# Patient Record
Sex: FEMALE | NOT HISPANIC OR LATINO | Employment: OTHER | URBAN - METROPOLITAN AREA
[De-identification: names, ages, dates, MRNs, and addresses within clinical notes are randomized per-mention and may not be internally consistent; named-entity substitution may affect disease eponyms.]

---

## 2023-05-16 DIAGNOSIS — E03.9 HYPOTHYROIDISM, UNSPECIFIED TYPE: ICD-10-CM

## 2023-05-16 RX ORDER — LEVOTHYROXINE SODIUM 137 UG/1
137 TABLET ORAL DAILY
Qty: 90 TABLET | Refills: 0 | Status: SHIPPED | OUTPATIENT
Start: 2023-05-16 | End: 2023-08-07

## 2023-05-16 RX ORDER — LEVOTHYROXINE SODIUM 137 UG/1
1 TABLET ORAL DAILY
COMMUNITY
Start: 2013-03-04 | End: 2023-05-16 | Stop reason: SDUPTHER

## 2023-08-07 DIAGNOSIS — E03.9 HYPOTHYROIDISM, UNSPECIFIED TYPE: ICD-10-CM

## 2023-08-07 RX ORDER — LEVOTHYROXINE SODIUM 137 UG/1
TABLET ORAL
Qty: 30 TABLET | Refills: 0 | Status: SHIPPED | OUTPATIENT
Start: 2023-08-07 | End: 2023-09-25 | Stop reason: SDUPTHER

## 2023-09-11 ENCOUNTER — HOSPITAL ENCOUNTER (OUTPATIENT)
Dept: DATA CONVERSION | Facility: HOSPITAL | Age: 68
Discharge: HOME | End: 2023-09-11
Payer: MEDICARE

## 2023-09-11 DIAGNOSIS — Z12.31 ENCOUNTER FOR SCREENING MAMMOGRAM FOR MALIGNANT NEOPLASM OF BREAST: ICD-10-CM

## 2023-09-13 ENCOUNTER — OFFICE VISIT (OUTPATIENT)
Dept: PRIMARY CARE | Facility: CLINIC | Age: 68
End: 2023-09-13
Payer: MEDICARE

## 2023-09-13 VITALS
HEART RATE: 75 BPM | OXYGEN SATURATION: 98 % | HEIGHT: 63 IN | RESPIRATION RATE: 18 BRPM | SYSTOLIC BLOOD PRESSURE: 130 MMHG | DIASTOLIC BLOOD PRESSURE: 60 MMHG | WEIGHT: 155.2 LBS | BODY MASS INDEX: 27.5 KG/M2

## 2023-09-13 DIAGNOSIS — Z00.00 ROUTINE GENERAL MEDICAL EXAMINATION AT HEALTH CARE FACILITY: ICD-10-CM

## 2023-09-13 DIAGNOSIS — E03.9 ACQUIRED HYPOTHYROIDISM: ICD-10-CM

## 2023-09-13 DIAGNOSIS — Z00.00 MEDICARE ANNUAL WELLNESS VISIT, SUBSEQUENT: ICD-10-CM

## 2023-09-13 DIAGNOSIS — I10 BENIGN ESSENTIAL HYPERTENSION: Primary | ICD-10-CM

## 2023-09-13 PROBLEM — N95.1 MENOPAUSAL SYMPTOM: Status: RESOLVED | Noted: 2023-09-13 | Resolved: 2023-09-13

## 2023-09-13 PROBLEM — I47.10 PAROXYSMAL SUPRAVENTRICULAR TACHYCARDIA (CMS-HCC): Status: RESOLVED | Noted: 2023-09-13 | Resolved: 2023-09-13

## 2023-09-13 PROBLEM — H90.3 ASYMMETRICAL SENSORINEURAL HEARING LOSS: Status: RESOLVED | Noted: 2023-09-13 | Resolved: 2023-09-13

## 2023-09-13 PROBLEM — T14.8XXA CONTUSION: Status: RESOLVED | Noted: 2023-09-13 | Resolved: 2023-09-13

## 2023-09-13 PROBLEM — R60.0 LEG EDEMA, LEFT: Status: RESOLVED | Noted: 2023-09-13 | Resolved: 2023-09-13

## 2023-09-13 PROBLEM — L71.9 ROSACEA: Status: ACTIVE | Noted: 2023-09-13

## 2023-09-13 PROBLEM — E83.52 HYPERCALCEMIA: Status: RESOLVED | Noted: 2023-09-13 | Resolved: 2023-09-13

## 2023-09-13 PROBLEM — J01.10 ACUTE FRONTAL SINUSITIS: Status: RESOLVED | Noted: 2023-09-13 | Resolved: 2023-09-13

## 2023-09-13 PROBLEM — S89.92XA INJURY OF LEFT KNEE: Status: RESOLVED | Noted: 2023-09-13 | Resolved: 2023-09-13

## 2023-09-13 PROBLEM — S87.02XA: Status: RESOLVED | Noted: 2023-09-13 | Resolved: 2023-09-13

## 2023-09-13 PROBLEM — D12.6 COLON ADENOMAS: Status: RESOLVED | Noted: 2023-09-13 | Resolved: 2023-09-13

## 2023-09-13 PROBLEM — S76.309A HAMSTRING INJURY: Status: RESOLVED | Noted: 2023-09-13 | Resolved: 2023-09-13

## 2023-09-13 PROBLEM — E78.5 HYPERLIPIDEMIA: Status: ACTIVE | Noted: 2023-09-13
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
## 2023-09-13 PROBLEM — K21.9 GERD (GASTROESOPHAGEAL REFLUX DISEASE): Status: ACTIVE | Noted: 2023-09-13

## 2023-09-13 PROBLEM — I42.8 NICM (NONISCHEMIC CARDIOMYOPATHY) (MULTI): Status: RESOLVED | Noted: 2023-09-13 | Resolved: 2023-09-13

## 2023-09-13 PROBLEM — I42.9 CARDIOMYOPATHY (MULTI): Status: RESOLVED | Noted: 2023-09-13 | Resolved: 2023-09-13

## 2023-09-13 PROBLEM — G62.9 PERIPHERAL NEUROPATHY: Status: ACTIVE | Noted: 2023-09-13

## 2023-09-13 PROBLEM — S87.82XA: Status: RESOLVED | Noted: 2023-09-13 | Resolved: 2023-09-13

## 2023-09-13 PROBLEM — S22.009A FRACTURE OF THORACIC SPINE (MULTI): Status: RESOLVED | Noted: 2023-09-13 | Resolved: 2023-09-13

## 2023-09-13 PROBLEM — M85.80 OSTEOPENIA: Status: RESOLVED | Noted: 2023-09-13 | Resolved: 2023-09-13

## 2023-09-13 PROBLEM — Z96.642 STATUS POST TOTAL REPLACEMENT OF LEFT HIP: Status: RESOLVED | Noted: 2023-09-13 | Resolved: 2023-09-13

## 2023-09-13 PROBLEM — C85.90 NON-HODGKIN'S LYMPHOMA (MULTI): Status: RESOLVED | Noted: 2023-09-13 | Resolved: 2023-09-13

## 2023-09-13 PROBLEM — M81.0 OSTEOPOROSIS: Status: ACTIVE | Noted: 2023-09-13

## 2023-09-13 PROBLEM — H90.3 BILATERAL SENSORINEURAL HEARING LOSS: Status: ACTIVE | Noted: 2023-09-13

## 2023-09-13 PROBLEM — D33.3 LEFT ACOUSTIC NEUROMA (MULTI): Status: RESOLVED | Noted: 2023-09-13 | Resolved: 2023-09-13

## 2023-09-13 PROBLEM — R29.898 WEAKNESS OF LEFT HIP: Status: RESOLVED | Noted: 2023-09-13 | Resolved: 2023-09-13

## 2023-09-13 PROBLEM — S32.409A ACETABULAR FRACTURE (MULTI): Status: RESOLVED | Noted: 2023-09-13 | Resolved: 2023-09-13

## 2023-09-13 PROBLEM — M16.10 HIP ARTHRITIS: Status: RESOLVED | Noted: 2023-09-13 | Resolved: 2023-09-13

## 2023-09-13 PROCEDURE — 99213 OFFICE O/P EST LOW 20 MIN: CPT | Performed by: NURSE PRACTITIONER

## 2023-09-13 PROCEDURE — 1160F RVW MEDS BY RX/DR IN RCRD: CPT | Performed by: NURSE PRACTITIONER

## 2023-09-13 PROCEDURE — G0439 PPPS, SUBSEQ VISIT: HCPCS | Performed by: NURSE PRACTITIONER

## 2023-09-13 PROCEDURE — 1170F FXNL STATUS ASSESSED: CPT | Performed by: NURSE PRACTITIONER

## 2023-09-13 PROCEDURE — 1125F AMNT PAIN NOTED PAIN PRSNT: CPT | Performed by: NURSE PRACTITIONER

## 2023-09-13 PROCEDURE — 1159F MED LIST DOCD IN RCRD: CPT | Performed by: NURSE PRACTITIONER

## 2023-09-13 PROCEDURE — 3078F DIAST BP <80 MM HG: CPT | Performed by: NURSE PRACTITIONER

## 2023-09-13 PROCEDURE — 3075F SYST BP GE 130 - 139MM HG: CPT | Performed by: NURSE PRACTITIONER

## 2023-09-13 PROCEDURE — 1036F TOBACCO NON-USER: CPT | Performed by: NURSE PRACTITIONER

## 2023-09-13 RX ORDER — METOPROLOL SUCCINATE 50 MG/1
50 TABLET, EXTENDED RELEASE ORAL DAILY
COMMUNITY
End: 2024-02-27 | Stop reason: SDUPTHER

## 2023-09-13 RX ORDER — ATORVASTATIN CALCIUM 20 MG/1
20 TABLET, FILM COATED ORAL EVERY 24 HOURS
COMMUNITY
Start: 2015-09-10 | End: 2024-04-22 | Stop reason: SDUPTHER

## 2023-09-13 RX ORDER — LOSARTAN POTASSIUM AND HYDROCHLOROTHIAZIDE 25; 100 MG/1; MG/1
1 TABLET ORAL DAILY
COMMUNITY
Start: 2023-02-28 | End: 2024-02-07 | Stop reason: SDUPTHER

## 2023-09-13 RX ORDER — PNV NO.95/FERROUS FUM/FOLIC AC 28MG-0.8MG
1 TABLET ORAL DAILY
COMMUNITY

## 2023-09-13 ASSESSMENT — PATIENT HEALTH QUESTIONNAIRE - PHQ9
SUM OF ALL RESPONSES TO PHQ9 QUESTIONS 1 AND 2: 0
2. FEELING DOWN, DEPRESSED OR HOPELESS: NOT AT ALL
1. LITTLE INTEREST OR PLEASURE IN DOING THINGS: NOT AT ALL

## 2023-09-13 ASSESSMENT — ACTIVITIES OF DAILY LIVING (ADL)
TAKING_MEDICATION: INDEPENDENT
GROCERY_SHOPPING: INDEPENDENT
BATHING: INDEPENDENT
DOING_HOUSEWORK: INDEPENDENT
DRESSING: INDEPENDENT
MANAGING_FINANCES: INDEPENDENT

## 2023-09-13 NOTE — PATIENT INSTRUCTIONS
[facility] - Dr. Heredia  Have your flu shot done by the beginning of October  Have your thyroid level checked  You are current with your pneumonia vaccine

## 2023-09-13 NOTE — PROGRESS NOTES
"Subjective   Reason for Visit: Cady Haynes is an 68 y.o. female here for a Medicare Wellness visit.     Past Medical, Surgical, and Family History reviewed and updated in chart.    Reviewed all medications by prescribing practitioner or clinical pharmacist (such as prescriptions, OTCs, herbal therapies and supplements) and documented in the medical record.    Presents for follow up and AMW    She was thrown from her horse 2 days ago.  She did suffer T4 fracture.  Will be following up with ortho spine.      Mammogram 9/23  Pap not indicated  Colonoscopy 1/24/23  Prevnar 20 2022        Patient Care Team:  KARIN Aguiar as PCP - General  KAR[place] Dickerson [place] PCP [place] [facility] [place]  KARIN Aguiar as Primary Care Provider     Review of Systems   All other systems reviewed and are negative.      Objective   Vitals:  /60   Pulse 75   Resp 18   Ht 1.6 m (5' 3\")   Wt 70.4 kg (155 lb 3.2 oz)   SpO2 98%   BMI 27.49 kg/m²       Physical Exam  Vitals and nursing note reviewed.   Constitutional:       General: She is not in acute distress.     Appearance: Normal appearance. She is normal weight.   HENT:      Head: Normocephalic and atraumatic.      Right Ear: Tympanic membrane, ear canal and external ear normal.      Left Ear: Tympanic membrane, ear canal and external ear normal.      Nose: Nose normal.      Mouth/Throat:      Mouth: Mucous membranes are moist.      Pharynx: Oropharynx is clear.   Eyes:      Extraocular Movements: Extraocular movements intact.      Conjunctiva/sclera: Conjunctivae normal.      Pupils: Pupils are equal, round, and reactive to light.   Neck:      Vascular: No carotid bruit.   Cardiovascular:      Rate and Rhythm: Normal rate and regular rhythm.      Pulses: Normal pulses.      Heart sounds: Normal heart sounds.   Pulmonary:      Effort: Pulmonary effort is normal.      Breath sounds: Normal breath sounds.   Abdominal:      General: " Bowel sounds are normal. There is no distension.      Palpations: Abdomen is soft.      Tenderness: There is no abdominal tenderness.   Musculoskeletal:         General: Normal range of motion.      Cervical back: Normal range of motion and neck supple.      Right lower leg: No edema.      Left lower leg: No edema.      Comments: Upper thoracic tenderness with palpation   Lymphadenopathy:      Cervical: No cervical adenopathy.   Skin:     General: Skin is warm and dry.      Capillary Refill: Capillary refill takes less than 2 seconds.   Neurological:      General: No focal deficit present.      Mental Status: She is alert and oriented to person, place, and time. Mental status is at baseline.      Motor: No weakness.   Psychiatric:         Mood and Affect: Mood normal.         Behavior: Behavior normal.         Thought Content: Thought content normal.         Judgment: Judgment normal.         Assessment/Plan   Problem List Items Addressed This Visit       Benign essential hypertension    Overview     Stable  Continue losartan - hydrochlorothiazide 100-25 mg daily  Continue metoprolol succinate XL 50 mg daily  Reviewed cbc,cmp  Lipid ordered  Follow up in 6 months         Current Assessment & Plan     Stable  Continue losartan - hydrochlorothiazide 100-25 mg daily  Continue metoprolol succinate XL 50 mg daily  Reviewed cbc,cmp  Lipid ordered  Follow up in 6 months         Relevant Orders    Lipid Panel (Completed)    Hypothyroidism    Overview     TSH  Will adjust levothyroxine based on results         Current Assessment & Plan     TSH  Will adjust levothyroxine based on results         Relevant Orders    TSH with reflex to Free T4 if abnormal    Medicare annual wellness visit, subsequent    Overview     - UTD with vaccine   -  colonoscopy 2023  - mammogram 2023  - dexa will wait one year since she recently fractured T4 and may alter score   -  living will reviewed with patient           Current Assessment & Plan      - UTD with vaccine   -  colonoscopy 2023  - mammogram 2023  - dexa will wait one year since she recently fractured T4 and may alter score   -  living will reviewed with patient          Other Visit Diagnoses       Routine general medical examination at health care facility    -  Primary                Protocol For Photochemotherapy For Severe Photoresponsive Dermatoses: Tar And Nbuvb (Goeckerman Treatment): The patient received Photochemotherapy for severe photoresponsive dermatoses: Tar and NBUVB (Goeckerman treatment) requiring at least 4 to 8 hours of care under direct physician supervision.

## 2023-09-15 ENCOUNTER — LAB (OUTPATIENT)
Dept: LAB | Facility: LAB | Age: 68
End: 2023-09-15
Payer: MEDICARE

## 2023-09-15 DIAGNOSIS — E03.9 ACQUIRED HYPOTHYROIDISM: ICD-10-CM

## 2023-09-15 DIAGNOSIS — I10 BENIGN ESSENTIAL HYPERTENSION: ICD-10-CM

## 2023-09-15 PROBLEM — Z00.00 MEDICARE ANNUAL WELLNESS VISIT, SUBSEQUENT: Status: ACTIVE | Noted: 2023-09-15

## 2023-09-15 LAB
CHOLESTEROL (MG/DL) IN SER/PLAS: 143 MG/DL (ref 0–199)
CHOLESTEROL IN HDL (MG/DL) IN SER/PLAS: 46 MG/DL
CHOLESTEROL/HDL RATIO: 3.1
LDL: 66 MG/DL (ref 0–99)
THYROTROPIN (MIU/L) IN SER/PLAS BY DETECTION LIMIT <= 0.05 MIU/L: 1.21 MIU/L (ref 0.44–3.98)
TRIGLYCERIDE (MG/DL) IN SER/PLAS: 157 MG/DL (ref 0–149)
VLDL: 31 MG/DL (ref 0–40)

## 2023-09-15 PROCEDURE — 80061 LIPID PANEL: CPT

## 2023-09-15 PROCEDURE — 36415 COLL VENOUS BLD VENIPUNCTURE: CPT

## 2023-09-15 PROCEDURE — 84443 ASSAY THYROID STIM HORMONE: CPT

## 2023-09-15 ASSESSMENT — PATIENT HEALTH QUESTIONNAIRE - PHQ9
1. LITTLE INTEREST OR PLEASURE IN DOING THINGS: NOT AT ALL
SUM OF ALL RESPONSES TO PHQ9 QUESTIONS 1 AND 2: 0
2. FEELING DOWN, DEPRESSED OR HOPELESS: NOT AT ALL

## 2023-09-15 NOTE — ASSESSMENT & PLAN NOTE
- UTD with vaccine   -  colonoscopy 2023  - mammogram 2023  - dexa will wait one year since she recently fractured T4 and may alter score   -  living will reviewed with patient

## 2023-09-15 NOTE — ASSESSMENT & PLAN NOTE
Stable  Continue losartan - hydrochlorothiazide 100-25 mg daily  Continue metoprolol succinate XL 50 mg daily  Reviewed cbc,cmp  Lipid ordered  Follow up in 6 months

## 2023-09-25 DIAGNOSIS — E03.9 HYPOTHYROIDISM, UNSPECIFIED TYPE: ICD-10-CM

## 2023-09-25 RX ORDER — LEVOTHYROXINE SODIUM 137 UG/1
137 TABLET ORAL
Qty: 90 TABLET | Refills: 1 | Status: SHIPPED | OUTPATIENT
Start: 2023-09-25 | End: 2024-03-12 | Stop reason: SDUPTHER

## 2023-12-21 ENCOUNTER — OFFICE VISIT (OUTPATIENT)
Dept: PRIMARY CARE | Facility: CLINIC | Age: 68
End: 2023-12-21
Payer: MEDICARE

## 2023-12-21 VITALS
TEMPERATURE: 98.3 F | OXYGEN SATURATION: 95 % | HEART RATE: 85 BPM | SYSTOLIC BLOOD PRESSURE: 130 MMHG | HEIGHT: 63 IN | BODY MASS INDEX: 28.53 KG/M2 | DIASTOLIC BLOOD PRESSURE: 60 MMHG | WEIGHT: 161 LBS

## 2023-12-21 DIAGNOSIS — J01.00 ACUTE MAXILLARY SINUSITIS, RECURRENCE NOT SPECIFIED: Primary | ICD-10-CM

## 2023-12-21 PROCEDURE — 1036F TOBACCO NON-USER: CPT | Performed by: FAMILY MEDICINE

## 2023-12-21 PROCEDURE — 3078F DIAST BP <80 MM HG: CPT | Performed by: FAMILY MEDICINE

## 2023-12-21 PROCEDURE — 1159F MED LIST DOCD IN RCRD: CPT | Performed by: FAMILY MEDICINE

## 2023-12-21 PROCEDURE — 99214 OFFICE O/P EST MOD 30 MIN: CPT | Performed by: FAMILY MEDICINE

## 2023-12-21 PROCEDURE — 1125F AMNT PAIN NOTED PAIN PRSNT: CPT | Performed by: FAMILY MEDICINE

## 2023-12-21 PROCEDURE — 1160F RVW MEDS BY RX/DR IN RCRD: CPT | Performed by: FAMILY MEDICINE

## 2023-12-21 PROCEDURE — 3075F SYST BP GE 130 - 139MM HG: CPT | Performed by: FAMILY MEDICINE

## 2023-12-21 RX ORDER — DOXYCYCLINE 100 MG/1
100 CAPSULE ORAL 2 TIMES DAILY
Qty: 20 CAPSULE | Refills: 0 | Status: SHIPPED | OUTPATIENT
Start: 2023-12-21 | End: 2023-12-31

## 2023-12-21 RX ORDER — BISMUTH SUBSALICYLATE 262 MG
1 TABLET,CHEWABLE ORAL DAILY
COMMUNITY

## 2023-12-21 RX ORDER — FLUTICASONE PROPIONATE 50 MCG
2 SPRAY, SUSPENSION (ML) NASAL DAILY
Qty: 16 G | Refills: 5 | Status: SHIPPED | OUTPATIENT
Start: 2023-12-21 | End: 2024-01-14

## 2023-12-21 NOTE — PATIENT INSTRUCTIONS
Start the doxycycline and the flonase     Drink a lot of warm liquids (teas and broth based soups)   Avoid diary and citrus.     Continue the nettipot     Tylenol as needed for muscle aches, headaches, sinus pressure and/or fevers.     Follow up was needed.

## 2023-12-21 NOTE — PROGRESS NOTES
"Subjective   Cady Haynes is a 68 y.o. female who presents for Sick Visit (Congested, for 10+ days headache and cough ).    HPI    Started 11 days   Scratchy throat and sinus congestion   Coughing now   Still having post nasal drainage   Nonproductive cough   Blowing a yellowish discharge from the nose  [place] and acetaminophen   More headaches   With the maxillary sinus pressure   Slight ear pain  Slight dizziness   No nausea   No chills, no fevers  Appetite is ok   No loss of smell or taste   Had COVID at [facility], tested POS at home 11/16/23   also had the flu shot   N[place]     ROS was completed and all systems are negative with the exception of what was noted in the the HPI.     Objective     /60   Pulse 85   Temp 36.8 °C (98.3 °F)   Ht 1.6 m (5' 3\")   Wt 73 kg (161 lb)   SpO2 95%   BMI 28.52 kg/m²      Physical Exam  GEN: A+O, no acute distress  HEENT: NC/AT, Oropharynx clear, no exudates, TM visualized, Extraoccular muscles intact, no facial droop; no thyromegaly or cervical LAD  RESP: CTAB, no wheezes   CV: RRR, no murmurs  ABD: soft, non-tender, + BS  SKIN: no rashes or bruising, no peripheral edema   NEURO: CN II-XII grossly intact, moves all extremities equally, no tremor   PSYCH: normal affect, appropriate mood      Assessment/Plan   Problem List Items Addressed This Visit    None    Start the doxycycline and the flonase     Drink a lot of warm liquids (teas and broth based soups)   Avoid diary and citrus.     Continue the nettipot     Tylenol as needed for muscle aches, headaches, sinus pressure and/or fevers.     Follow up was needed.        Jodie Valadez DO, MSMed, ABOM  [address]   [address]   [place] [address]  Ph. [phone]  Fx. [phone]  "
76

## 2024-01-12 DIAGNOSIS — J01.00 ACUTE MAXILLARY SINUSITIS, RECURRENCE NOT SPECIFIED: ICD-10-CM

## 2024-01-14 RX ORDER — FLUTICASONE PROPIONATE 50 MCG
2 SPRAY, SUSPENSION (ML) NASAL DAILY
Qty: 48 ML | Refills: 2 | Status: SHIPPED | OUTPATIENT
Start: 2024-01-14 | End: 2025-01-13

## 2024-02-07 DIAGNOSIS — I10 BENIGN ESSENTIAL HYPERTENSION: ICD-10-CM

## 2024-02-08 RX ORDER — LOSARTAN POTASSIUM AND HYDROCHLOROTHIAZIDE 25; 100 MG/1; MG/1
1 TABLET ORAL DAILY
Qty: 90 TABLET | Refills: 0 | Status: SHIPPED | OUTPATIENT
Start: 2024-02-08 | End: 2024-04-26

## 2024-02-27 ENCOUNTER — OFFICE VISIT (OUTPATIENT)
Dept: CARDIOLOGY | Facility: CLINIC | Age: 69
End: 2024-02-27
Payer: MEDICARE

## 2024-02-27 VITALS
OXYGEN SATURATION: 96 % | BODY MASS INDEX: 26.46 KG/M2 | SYSTOLIC BLOOD PRESSURE: 152 MMHG | HEIGHT: 64 IN | RESPIRATION RATE: 16 BRPM | DIASTOLIC BLOOD PRESSURE: 82 MMHG | HEART RATE: 75 BPM | WEIGHT: 155 LBS

## 2024-02-27 DIAGNOSIS — I49.9 CARDIAC ARRHYTHMIA, UNSPECIFIED CARDIAC ARRHYTHMIA TYPE: Primary | ICD-10-CM

## 2024-02-27 DIAGNOSIS — I49.3 PVC (PREMATURE VENTRICULAR CONTRACTION): ICD-10-CM

## 2024-02-27 DIAGNOSIS — I42.7 CARDIOMYOPATHY SECONDARY TO DRUG (MULTI): ICD-10-CM

## 2024-02-27 DIAGNOSIS — I10 BENIGN ESSENTIAL HYPERTENSION: ICD-10-CM

## 2024-02-27 PROCEDURE — 93005 ELECTROCARDIOGRAM TRACING: CPT | Performed by: INTERNAL MEDICINE

## 2024-02-27 PROCEDURE — 1157F ADVNC CARE PLAN IN RCRD: CPT | Performed by: INTERNAL MEDICINE

## 2024-02-27 PROCEDURE — 1159F MED LIST DOCD IN RCRD: CPT | Performed by: INTERNAL MEDICINE

## 2024-02-27 PROCEDURE — 93010 ELECTROCARDIOGRAM REPORT: CPT | Performed by: INTERNAL MEDICINE

## 2024-02-27 PROCEDURE — 3077F SYST BP >= 140 MM HG: CPT | Performed by: INTERNAL MEDICINE

## 2024-02-27 PROCEDURE — 99215 OFFICE O/P EST HI 40 MIN: CPT | Performed by: INTERNAL MEDICINE

## 2024-02-27 PROCEDURE — 3079F DIAST BP 80-89 MM HG: CPT | Performed by: INTERNAL MEDICINE

## 2024-02-27 PROCEDURE — 1125F AMNT PAIN NOTED PAIN PRSNT: CPT | Performed by: INTERNAL MEDICINE

## 2024-02-27 PROCEDURE — 1036F TOBACCO NON-USER: CPT | Performed by: INTERNAL MEDICINE

## 2024-02-27 RX ORDER — CLOTRIMAZOLE 1 %
CREAM (GRAM) TOPICAL 2 TIMES DAILY
COMMUNITY
Start: 2024-02-16

## 2024-02-27 RX ORDER — TRIAMCINOLONE ACETONIDE 0.25 MG/G
CREAM TOPICAL
COMMUNITY
Start: 2024-02-13

## 2024-02-27 RX ORDER — METOPROLOL SUCCINATE 100 MG/1
100 TABLET, EXTENDED RELEASE ORAL DAILY
Qty: 30 TABLET | Refills: 11 | Status: SHIPPED | OUTPATIENT
Start: 2024-02-27 | End: 2025-02-26

## 2024-02-27 RX ORDER — NYSTATIN 100000 U/G
CREAM TOPICAL
COMMUNITY
Start: 2024-02-13

## 2024-02-27 RX ORDER — KETOCONAZOLE 20 MG/ML
SHAMPOO, SUSPENSION TOPICAL
COMMUNITY
Start: 2024-02-13

## 2024-02-27 ASSESSMENT — ENCOUNTER SYMPTOMS
ALLERGIC/IMMUNOLOGIC NEGATIVE: 1
CONSTITUTIONAL NEGATIVE: 1
PSYCHIATRIC NEGATIVE: 1
HEMATOLOGIC/LYMPHATIC NEGATIVE: 1
MUSCULOSKELETAL NEGATIVE: 1
SHORTNESS OF BREATH: 1
DEPRESSION: 0
GASTROINTESTINAL NEGATIVE: 1
EYES NEGATIVE: 1
PALPITATIONS: 1
ENDOCRINE NEGATIVE: 1
NEUROLOGICAL NEGATIVE: 1

## 2024-02-27 ASSESSMENT — PATIENT HEALTH QUESTIONNAIRE - PHQ9
SUM OF ALL RESPONSES TO PHQ9 QUESTIONS 1 AND 2: 0
1. LITTLE INTEREST OR PLEASURE IN DOING THINGS: NOT AT ALL
2. FEELING DOWN, DEPRESSED OR HOPELESS: NOT AT ALL

## 2024-02-27 NOTE — PROGRESS NOTES
"Chief Complaint:   Follow-up (Annual FUV)     History Of Present Illness:    Cady Haynes is a 68 y.o. female presenting for follow-up.  Had been feeling well until earlier this month when she started getting increased palpitations.  These have been when ambulating, usually in the barn.  Feels her heart racing, has checked her heart rate has been up to 136.  Episodes last about 10 minutes and then resolved.  No syncope.  Denies any progressive angina.  Is compliant with her medications.     Last Recorded Vitals:  Vitals:    02/27/24 0950   BP: 152/82   BP Location: Right arm   Patient Position: Sitting   BP Cuff Size: Adult   Pulse: 75   Resp: 16   SpO2: 96%   Weight: 70.3 kg (155 lb)   Height: 1.626 m (5' 4\")       Past Medical History:  She has a past medical history of Acetabular fracture (CMS/[facility]) (09/13/2023), Acute upper respiratory infection, unspecified (12/10/2014), Candidiasis of skin and nail (06/10/2021), Cardiomyopathy (CMS/[facility]) (09/13/2023), Cardiomyopathy, unspecified (CMS/[facility]), Cellulitis of unspecified part of limb (06/16/2014), Cellulitis, unspecified, Contusion (09/13/2023), Contusion of unspecified front wall of thorax, initial encounter (06/16/2014), Contusion of unspecified lower leg, initial encounter (10/13/2021), Effusion, unspecified ankle (10/15/2019), Encounter for general adult medical examination without abnormal findings (12/09/2015), Encounter for immunization (12/09/2015), Encounter for other preprocedural examination (12/07/2020), Encounter for other specified surgical aftercare (12/06/2017), Encounter for preprocedural cardiovascular examination (11/30/2020), Encounter for preprocedural laboratory examination (11/22/2021), Enlarged lymph nodes, unspecified (04/02/2019), Fall from horse (09/13/2023), Fracture of thoracic spine (CMS/HCC) (09/13/2023), Hamstring injury (09/13/2023), Hypercalcemia (09/13/2023), Impaired fasting glucose (02/08/2018), Infection and inflammatory " reaction due to other internal prosthetic devices, implants and grafts, initial encounter (CMS/[facility]) (06/17/2018), Injury of left knee (09/13/2023), Insect bite (nonvenomous), left ankle, initial encounter (CODE) (08/31/2020), Left acoustic neuroma (CMS/[facility]) (09/13/2023), Menopausal symptom (09/13/2023), Metabolic syndrome (02/25/2015), NICM (nonischemic cardiomyopathy) (CMS/[facility]) (09/13/2023), Non-Hodgkin lymphoma, unspecified, unspecified site (CMS/[facility]), Non-Hodgkin's lymphoma (CMS/[facility]) (09/13/2023), Osteopenia (09/13/2023), Other conditions influencing health status, Other conditions influencing health status (11/06/2012), Other reduced mobility (03/20/2020), Pain in left hip (12/15/2020), Pain in left knee (12/23/2019), Paroxysmal supraventricular tachycardia (09/13/2023), Personal history of diseases of the skin and subcutaneous tissue, Personal history of other diseases of the circulatory system, Personal history of other diseases of the musculoskeletal system and connective tissue (10/27/2017), Personal history of other diseases of the respiratory system, Personal history of other drug therapy (11/06/2012), Personal history of other endocrine, nutritional and metabolic disease (09/12/2018), Personal history of other mental and behavioral disorders (12/04/2013), Personal history of other specified conditions (09/26/2014), Personal history of other specified conditions (08/27/2013), Personal history of other specified conditions (10/27/2017), 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    Past Surgical History:  She has a past surgical history that includes Tonsillectomy (11/06/2012) and Other surgical history (11/06/2012).      Social  History:  She reports that she has never smoked. She has never used smokeless tobacco. She reports that she does not currently use alcohol. She reports that she does not use drugs.    Family History:  No family history on file.     Allergies:  Patient has no known allergies.    Outpatient Medications:  Current Outpatient Medications   Medication Instructions    alpha tocopherol (Vitamin E) 670 mg (1,000 unit) capsule 1 capsule, oral, Daily    atorvastatin (LIPITOR) 20 mg, oral, Every 24 hours    CALCIUM CITRATE-VITAMIN D2 ORAL 1 tablet, oral, Daily    clotrimazole (Lotrimin) 1 % cream Topical, 2 times daily, to affected area    fluticasone (Flonase) 50 mcg/actuation nasal spray 2 sprays, Each Nostril, Daily, Shake gently. Before first use, prime pump. After use, clean tip and replace cap.    ketoconazole (NIZOral) 2 % shampoo APPLY TO AFFECTED AREA, LEAVE ON FOR 5 MINUTES THEN RINSE OFF    levothyroxine (SYNTHROID, LEVOXYL) 137 mcg, oral, Daily before breakfast    losartan-hydrochlorothiazide (Hyzaar) 100-25 mg tablet 1 tablet, oral, Daily    metoprolol succinate XL (TOPROL-XL) 50 mg, oral, Daily, Do not crush or chew.    multivitamin tablet 1 tablet, oral, Daily    nystatin (Mycostatin) cream APPLY TO AFFECTED AREA TWICE A DAY FOR RASH    triamcinolone (Kenalog) 0.025 % cream APPLY TO RASH TWICE DAILY FOR MAX OF 14 DAYS. STOP FOR 7 DAYS THEN REPEAT AS NEEDED     Review of Systems   Constitutional: Negative.    HENT: Negative.     Eyes: Negative.    Respiratory:  Positive for shortness of breath.    Cardiovascular:  Positive for palpitations.   Gastrointestinal: Negative.    Endocrine: Negative.    Genitourinary: Negative.    Musculoskeletal: Negative.    Skin: Negative.    Allergic/Immunologic: Negative.    Neurological: Negative.    Hematological: Negative.    Psychiatric/Behavioral: Negative.          Physical Exam:  Constitutional:       Appearance: Healthy appearance. Not in distress.   Neck:      Vascular: No  JVR. JVD normal.   Pulmonary:      Effort: Pulmonary effort is normal.      Breath sounds: Normal breath sounds. No wheezing. No rhonchi. No rales.   Chest:      Chest wall: Not tender to palpatation.   Cardiovascular:      Normal rate. Regular rhythm.      Murmurs: There is no murmur.   Edema:     Peripheral edema absent.   Abdominal:      General: Bowel sounds are normal.      Palpations: Abdomen is soft.      Tenderness: There is no abdominal tenderness.   Musculoskeletal: Normal range of motion. Skin:     General: Skin is warm and dry.   Neurological:      General: No focal deficit present.      Mental Status: Alert and oriented to person, place and time.           Last Labs:  CBC -  Lab Results   Component Value Date    WBC 20.7 (H) 09/11/2023    HGB 15.6 09/11/2023    HCT 46.4 (H) 09/11/2023    MCV 86 09/11/2023     09/11/2023       CMP -  Lab Results   Component Value Date    CALCIUM 10.3 09/11/2023    PHOS 2.7 09/15/2020    PROT 6.5 09/11/2023    ALBUMIN 4.5 09/11/2023    ALBUMIN 4.3 02/26/2021    AST 25 09/11/2023    ALT 36 09/11/2023    ALKPHOS 90 09/11/2023    BILITOT 1.1 09/11/2023       LIPID PANEL -   Lab Results   Component Value Date    CHOL 143 09/15/2023    TRIG 157 (H) 09/15/2023    HDL 46.0 09/15/2023    CHHDL 3.1 09/15/2023    LDLF 66 09/15/2023    VLDL 31 09/15/2023       RENAL FUNCTION PANEL -   Lab Results   Component Value Date    GLUCOSE 103 (H) 09/11/2023     09/11/2023    K 3.4 (L) 09/11/2023     09/11/2023    CO2 30 09/11/2023    ANIONGAP 15 09/11/2023    BUN 21 09/11/2023    CREATININE 0.73 09/11/2023    CALCIUM 10.3 09/11/2023    PHOS 2.7 09/15/2020    ALBUMIN 4.5 09/11/2023    ALBUMIN 4.3 02/26/2021        Lab Results   Component Value Date    BNP 19 02/16/2018    HGBA1C 5.2 02/16/2018       Last Cardiology Tests:  ECG independently reviewed from today: Sinus rhythm, rate 70, LVH    ZIO March 2023:  Patient had a min HR of 58 bpm, max HR of 124 bpm, and avg HR of  79  bpm. Predominant underlying rhythm was Sinus Rhythm. Bundle Branch  Block/IVCD was present. 1 run of Ventricular Tachycardia occurred lasting  5 beats with a max rate of 124 bpm (avg 103 bpm). Isolated SVEs were  occasional (1.2%, 8239), SVE Couplets were rare (<1.0%, 21), and no SVE  Triplets were present. Isolated VEs were rare (<1.0%), and no VE Couplets  or VE Triplets were present.    Echo 9/2021:  1. The left ventricular systolic function is normal with a 55-60% estimated ejection fraction.  2. Spectral Doppler shows an impaired relaxation pattern of left ventricular diastolic filling.  3. There is no evidence of left ventricular hypertrophy.  4. RVSP within normal limits.     Stress nuclear 11/2020:  1. Normal myocardial perfusion study without evidence of ischemia or  prior infarction.  2. The left ventricle is normal in size.  3. Normal LV wall motion with an LV EF estimated at greater than 55%.       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     She has been having increased palpitations--had 3 episodes this month in which she was symptomatic and heart rate was in the 130s when she checked it.  I am going to have her do an exercise stress test to assess for any exercise-induced arrhythmias.  Will check 2D echocardiogram as well.  Will hold off on empiric repeat ambulatory monitoring for now, however will increase her metoprolol to 100 mg daily for symptom control and continue current losartan and atorvastatin.             Desmond Saeed MD

## 2024-03-10 LAB
ATRIAL RATE: 70 BPM
P AXIS: 46 DEGREES
P OFFSET: 203 MS
P ONSET: 152 MS
PR INTERVAL: 128 MS
Q ONSET: 216 MS
QRS COUNT: 12 BEATS
QRS DURATION: 124 MS
QT INTERVAL: 398 MS
QTC CALCULATION(BAZETT): 429 MS
QTC FREDERICIA: 419 MS
R AXIS: -8 DEGREES
T AXIS: 51 DEGREES
T OFFSET: 415 MS
VENTRICULAR RATE: 70 BPM

## 2024-03-12 DIAGNOSIS — E03.9 HYPOTHYROIDISM, UNSPECIFIED TYPE: ICD-10-CM

## 2024-03-12 RX ORDER — LEVOTHYROXINE SODIUM 137 UG/1
137 TABLET ORAL
Qty: 90 TABLET | Refills: 0 | Status: SHIPPED | OUTPATIENT
Start: 2024-03-12 | End: 2024-05-23

## 2024-03-19 ENCOUNTER — APPOINTMENT (OUTPATIENT)
Dept: CARDIOLOGY | Facility: HOSPITAL | Age: 69
End: 2024-03-19
Payer: MEDICARE

## 2024-03-19 ENCOUNTER — HOSPITAL ENCOUNTER (OUTPATIENT)
Dept: CARDIOLOGY | Facility: HOSPITAL | Age: 69
Discharge: HOME | End: 2024-03-19
Payer: MEDICARE

## 2024-03-19 DIAGNOSIS — I49.9 CARDIAC ARRHYTHMIA, UNSPECIFIED CARDIAC ARRHYTHMIA TYPE: ICD-10-CM

## 2024-03-19 DIAGNOSIS — I49.3 PVC (PREMATURE VENTRICULAR CONTRACTION): ICD-10-CM

## 2024-03-19 PROCEDURE — 93306 TTE W/DOPPLER COMPLETE: CPT

## 2024-03-19 PROCEDURE — 93017 CV STRESS TEST TRACING ONLY: CPT

## 2024-03-19 PROCEDURE — 93018 CV STRESS TEST I&R ONLY: CPT | Performed by: INTERNAL MEDICINE

## 2024-03-19 PROCEDURE — 93016 CV STRESS TEST SUPVJ ONLY: CPT | Performed by: INTERNAL MEDICINE

## 2024-03-19 PROCEDURE — 93306 TTE W/DOPPLER COMPLETE: CPT | Performed by: INTERNAL MEDICINE

## 2024-04-15 LAB
AORTIC VALVE MEAN GRADIENT: 4.4 MMHG
AORTIC VALVE PEAK VELOCITY: 1.37 M/S
AV PEAK GRADIENT: 7.5 MMHG
AVA (PEAK VEL): 1.84 CM2
AVA (VTI): 2.33 CM2
EJECTION FRACTION APICAL 4 CHAMBER: 45.3
LEFT ATRIUM VOLUME AREA LENGTH INDEX BSA: 32.7 ML/M2
LEFT VENTRICLE INTERNAL DIMENSION DIASTOLE: 3.88 CM (ref 3.5–6)
LEFT VENTRICULAR OUTFLOW TRACT DIAMETER: 2.13 CM
LV EJECTION FRACTION BIPLANE: 43 %
MITRAL VALVE E/A RATIO: 0.72
MITRAL VALVE E/E' RATIO: 15.53
RIGHT VENTRICLE FREE WALL PEAK S': 12 CM/S
RIGHT VENTRICLE PEAK SYSTOLIC PRESSURE: 26.9 MMHG

## 2024-04-22 DIAGNOSIS — E78.5 HYPERLIPIDEMIA, UNSPECIFIED HYPERLIPIDEMIA TYPE: ICD-10-CM

## 2024-04-23 RX ORDER — ATORVASTATIN CALCIUM 20 MG/1
20 TABLET, FILM COATED ORAL EVERY 24 HOURS
Qty: 90 TABLET | Refills: 1 | Status: SHIPPED | OUTPATIENT
Start: 2024-04-23

## 2024-04-26 DIAGNOSIS — I10 BENIGN ESSENTIAL HYPERTENSION: ICD-10-CM

## 2024-04-26 RX ORDER — LOSARTAN POTASSIUM AND HYDROCHLOROTHIAZIDE 25; 100 MG/1; MG/1
1 TABLET ORAL DAILY
Qty: 90 TABLET | Refills: 3 | Status: SHIPPED | OUTPATIENT
Start: 2024-04-26

## 2024-05-23 DIAGNOSIS — E03.9 HYPOTHYROIDISM, UNSPECIFIED TYPE: ICD-10-CM

## 2024-05-23 RX ORDER — LEVOTHYROXINE SODIUM 137 UG/1
137 TABLET ORAL
Qty: 90 TABLET | Refills: 0 | Status: SHIPPED | OUTPATIENT
Start: 2024-05-23

## 2024-05-30 ENCOUNTER — APPOINTMENT (OUTPATIENT)
Dept: PRIMARY CARE | Facility: CLINIC | Age: 69
End: 2024-05-30
Payer: MEDICARE

## 2024-06-12 ENCOUNTER — APPOINTMENT (OUTPATIENT)
Dept: PRIMARY CARE | Facility: CLINIC | Age: 69
End: 2024-06-12
Payer: MEDICARE

## 2024-06-12 ENCOUNTER — HOSPITAL ENCOUNTER (OUTPATIENT)
Dept: RADIOLOGY | Facility: CLINIC | Age: 69
Discharge: HOME | End: 2024-06-12
Payer: MEDICARE

## 2024-06-12 VITALS
DIASTOLIC BLOOD PRESSURE: 67 MMHG | HEART RATE: 64 BPM | WEIGHT: 153 LBS | BODY MASS INDEX: 26.12 KG/M2 | HEIGHT: 64 IN | OXYGEN SATURATION: 92 % | SYSTOLIC BLOOD PRESSURE: 120 MMHG

## 2024-06-12 DIAGNOSIS — M79.644 PAIN OF FINGER OF RIGHT HAND: ICD-10-CM

## 2024-06-12 DIAGNOSIS — E78.2 MIXED HYPERLIPIDEMIA: ICD-10-CM

## 2024-06-12 DIAGNOSIS — I10 BENIGN ESSENTIAL HYPERTENSION: ICD-10-CM

## 2024-06-12 DIAGNOSIS — Z00.00 ROUTINE GENERAL MEDICAL EXAMINATION AT HEALTH CARE FACILITY: ICD-10-CM

## 2024-06-12 DIAGNOSIS — E03.9 ACQUIRED HYPOTHYROIDISM: ICD-10-CM

## 2024-06-12 DIAGNOSIS — Z78.0 ASYMPTOMATIC AGE-RELATED POSTMENOPAUSAL STATE: Primary | ICD-10-CM

## 2024-06-12 PROCEDURE — 1123F ACP DISCUSS/DSCN MKR DOCD: CPT | Performed by: NURSE PRACTITIONER

## 2024-06-12 PROCEDURE — 73130 X-RAY EXAM OF HAND: CPT | Mod: RIGHT SIDE | Performed by: RADIOLOGY

## 2024-06-12 PROCEDURE — 1157F ADVNC CARE PLAN IN RCRD: CPT | Performed by: NURSE PRACTITIONER

## 2024-06-12 PROCEDURE — 1036F TOBACCO NON-USER: CPT | Performed by: NURSE PRACTITIONER

## 2024-06-12 PROCEDURE — 73130 X-RAY EXAM OF HAND: CPT | Mod: RT

## 2024-06-12 PROCEDURE — 3078F DIAST BP <80 MM HG: CPT | Performed by: NURSE PRACTITIONER

## 2024-06-12 PROCEDURE — 3074F SYST BP LT 130 MM HG: CPT | Performed by: NURSE PRACTITIONER

## 2024-06-12 PROCEDURE — 99213 OFFICE O/P EST LOW 20 MIN: CPT | Performed by: NURSE PRACTITIONER

## 2024-06-12 PROCEDURE — 1170F FXNL STATUS ASSESSED: CPT | Performed by: NURSE PRACTITIONER

## 2024-06-12 PROCEDURE — 1160F RVW MEDS BY RX/DR IN RCRD: CPT | Performed by: NURSE PRACTITIONER

## 2024-06-12 PROCEDURE — 1159F MED LIST DOCD IN RCRD: CPT | Performed by: NURSE PRACTITIONER

## 2024-06-12 PROCEDURE — 1158F ADVNC CARE PLAN TLK DOCD: CPT | Performed by: NURSE PRACTITIONER

## 2024-06-12 ASSESSMENT — ACTIVITIES OF DAILY LIVING (ADL)
EATING: INDEPENDENT
NEEDS ASSISTANCE WITH FOOD: INDEPENDENT
ADEQUATE_TO_COMPLETE_ADL: YES
FEEDING: INDEPENDENT
JUDGMENT_ADEQUATE_SAFELY_COMPLETE_DAILY_ACTIVITIES: YES
BATHING: INDEPENDENT
STIL DRIVING: YES
DOING HOUSEWORK: INDEPENDENT
GROCERY SHOPPING: INDEPENDENT
TOILETING: INDEPENDENT
PILL BOX USED: YES
PREPARING MEALS: INDEPENDENT
USING TELEPHONE: INDEPENDENT
DRESSING: INDEPENDENT
MANAGING FINANCES: INDEPENDENT
TAKING MEDICATION: INDEPENDENT
USING TRANSPORTATION: INDEPENDENT

## 2024-06-12 ASSESSMENT — PATIENT HEALTH QUESTIONNAIRE - PHQ9
1. LITTLE INTEREST OR PLEASURE IN DOING THINGS: NOT AT ALL
2. FEELING DOWN, DEPRESSED OR HOPELESS: NOT AT ALL
SUM OF ALL RESPONSES TO PHQ9 QUESTIONS 1 AND 2: 0

## 2024-06-14 ENCOUNTER — TELEPHONE (OUTPATIENT)
Dept: CARDIOLOGY | Facility: HOSPITAL | Age: 69
End: 2024-06-14
Payer: MEDICARE

## 2024-06-14 DIAGNOSIS — S62.629A: Primary | ICD-10-CM

## 2024-07-01 PROBLEM — M79.644 PAIN OF FINGER OF RIGHT HAND: Status: ACTIVE | Noted: 2024-07-01

## 2024-07-01 ASSESSMENT — ACTIVITIES OF DAILY LIVING (ADL)
MANAGING_FINANCES: INDEPENDENT
TAKING_MEDICATION: INDEPENDENT
GROCERY_SHOPPING: INDEPENDENT
BATHING: INDEPENDENT
DRESSING: INDEPENDENT

## 2024-07-01 NOTE — PROGRESS NOTES
"Subjective   Reason for Visit: Cady Haynes is an 69 y.o. female here for a Medicare Wellness visit.     Past Medical, Surgical, and Family History reviewed and updated in chart.    Reviewed all medications by prescribing practitioner or clinical pharmacist (such as prescriptions, OTCs, herbal therapies and supplements) and documented in the medical record.    Presents for annual Medicare wellness and follow-up    Mammogram 2023  DEXA she was due last year but had a recent fracture so we discussed waiting until now to repeat DEXA  Colonoscopy 2023  She has had some pain in her right hand and some swelling noted to the right middle finger makes it difficult to bend her fingers        Patient Care Team:  KARIN Judge as PCP - General  KARIN Judge as Primary Care Provider     Review of Systems   All other systems reviewed and are negative.      Objective   Vitals:  /67   Pulse 64   Ht 1.626 m (5' 4\")   Wt 69.4 kg (153 lb)   SpO2 92%   BMI 26.26 kg/m²       Physical Exam  Vitals and nursing note reviewed.   Constitutional:       General: She is not in acute distress.     Appearance: Normal appearance.   HENT:      Head: Normocephalic and atraumatic.      Right Ear: Tympanic membrane, ear canal and external ear normal.      Left Ear: Tympanic membrane, ear canal and external ear normal.      Nose: Nose normal.      Mouth/Throat:      Mouth: Mucous membranes are moist.      Pharynx: Oropharynx is clear.   Eyes:      Extraocular Movements: Extraocular movements intact.      Conjunctiva/sclera: Conjunctivae normal.      Pupils: Pupils are equal, round, and reactive to light.   Neck:      Vascular: No carotid bruit.   Cardiovascular:      Rate and Rhythm: Normal rate and regular rhythm.      Pulses: Normal pulses.      Heart sounds: Normal heart sounds.   Pulmonary:      Effort: Pulmonary effort is normal.      Breath sounds: Normal breath sounds.   Abdominal: "      General: Bowel sounds are normal. There is no distension.      Palpations: Abdomen is soft.      Tenderness: There is no abdominal tenderness.   Musculoskeletal:         General: Normal range of motion.      Cervical back: Normal range of motion and neck supple.      Right lower leg: No edema.      Left lower leg: No edema.   Lymphadenopathy:      Cervical: No cervical adenopathy.   Skin:     General: Skin is warm and dry.      Capillary Refill: Capillary refill takes less than 2 seconds.   Neurological:      General: No focal deficit present.      Mental Status: She is alert and oriented to person, place, and time. Mental status is at baseline.      Motor: No weakness.   Psychiatric:         Mood and Affect: Mood normal.         Behavior: Behavior normal.         Thought Content: Thought content normal.         Judgment: Judgment normal.         Assessment/Plan   Problem List Items Addressed This Visit       Benign essential hypertension    Overview     Stable  Continue losartan - hydrochlorothiazide 100-25 mg daily  Continue metoprolol succinate XL 50 mg daily  Reviewed labs  Follow up in 6 months         Current Assessment & Plan     Stable  Continue losartan - hydrochlorothiazide 100-25 mg daily  Continue metoprolol succinate XL 50 mg daily  Reviewed labs  Follow up in 6 months         Hyperlipidemia    Overview     Stable  Continue losartan - hydrochlorothiazide 100-25 mg daily  Continue metoprolol succinate XL 50 mg daily  Reviewed labs  Follow up in 6 months         Current Assessment & Plan     Stable  Continue losartan - hydrochlorothiazide 100-25 mg daily  Continue metoprolol succinate XL 50 mg daily  Reviewed labs  Follow up in 6 months         Hypothyroidism    Overview     TSH stable  Continue levothyroxine 137 mcg daily         Current Assessment & Plan     TSH stable  Continue levothyroxine 137 mcg daily         Pain of finger of right hand    Overview     Likely OA  X-ray hand ordered          Current Assessment & Plan     Likely OA  X-ray hand ordered         Relevant Orders    XR hand right 3+ views (Completed)     Other Visit Diagnoses       Asymptomatic age-related postmenopausal state    -  Primary    Relevant Orders    XR DEXA bone density    Routine general medical examination at health care facility

## 2024-07-01 NOTE — ASSESSMENT & PLAN NOTE
- UTD with vaccine   -  colonoscopy 2023  - mammogram 2023  - dexa ordered  -  living will reviewed with patient

## 2024-07-01 NOTE — ASSESSMENT & PLAN NOTE
Stable  Continue losartan - hydrochlorothiazide 100-25 mg daily  Continue metoprolol succinate XL 50 mg daily  Reviewed labs  Follow up in 6 months

## 2024-08-21 ENCOUNTER — OFFICE VISIT (OUTPATIENT)
Dept: PRIMARY CARE | Facility: CLINIC | Age: 69
End: 2024-08-21
Payer: MEDICARE

## 2024-08-21 VITALS
WEIGHT: 153 LBS | OXYGEN SATURATION: 97 % | BODY MASS INDEX: 26.12 KG/M2 | SYSTOLIC BLOOD PRESSURE: 163 MMHG | HEART RATE: 65 BPM | HEIGHT: 64 IN | DIASTOLIC BLOOD PRESSURE: 88 MMHG

## 2024-08-21 DIAGNOSIS — E03.9 HYPOTHYROIDISM, UNSPECIFIED TYPE: ICD-10-CM

## 2024-08-21 DIAGNOSIS — R31.9 HEMATURIA, UNSPECIFIED TYPE: ICD-10-CM

## 2024-08-21 DIAGNOSIS — R39.9 URINARY TRACT INFECTION SYMPTOMS: ICD-10-CM

## 2024-08-21 DIAGNOSIS — L03.115 CELLULITIS OF RIGHT LOWER EXTREMITY: Primary | ICD-10-CM

## 2024-08-21 DIAGNOSIS — I49.3 PVC (PREMATURE VENTRICULAR CONTRACTION): ICD-10-CM

## 2024-08-21 LAB
POC APPEARANCE, URINE: CLEAR
POC BILIRUBIN, URINE: NEGATIVE
POC BLOOD, URINE: ABNORMAL
POC COLOR, URINE: YELLOW
POC GLUCOSE, URINE: NEGATIVE MG/DL
POC KETONES, URINE: NEGATIVE MG/DL
POC LEUKOCYTES, URINE: ABNORMAL
POC NITRITE,URINE: NEGATIVE
POC PH, URINE: 7 PH
POC PROTEIN, URINE: NEGATIVE MG/DL
POC SPECIFIC GRAVITY, URINE: 1.01
POC UROBILINOGEN, URINE: 0.2 EU/DL

## 2024-08-21 PROCEDURE — 99214 OFFICE O/P EST MOD 30 MIN: CPT | Performed by: NURSE PRACTITIONER

## 2024-08-21 PROCEDURE — 3077F SYST BP >= 140 MM HG: CPT | Performed by: NURSE PRACTITIONER

## 2024-08-21 PROCEDURE — 1159F MED LIST DOCD IN RCRD: CPT | Performed by: NURSE PRACTITIONER

## 2024-08-21 PROCEDURE — 81003 URINALYSIS AUTO W/O SCOPE: CPT | Performed by: NURSE PRACTITIONER

## 2024-08-21 PROCEDURE — 1036F TOBACCO NON-USER: CPT | Performed by: NURSE PRACTITIONER

## 2024-08-21 PROCEDURE — 1123F ACP DISCUSS/DSCN MKR DOCD: CPT | Performed by: NURSE PRACTITIONER

## 2024-08-21 PROCEDURE — 3079F DIAST BP 80-89 MM HG: CPT | Performed by: NURSE PRACTITIONER

## 2024-08-21 PROCEDURE — 1157F ADVNC CARE PLAN IN RCRD: CPT | Performed by: NURSE PRACTITIONER

## 2024-08-21 PROCEDURE — 87086 URINE CULTURE/COLONY COUNT: CPT

## 2024-08-21 PROCEDURE — 3008F BODY MASS INDEX DOCD: CPT | Performed by: NURSE PRACTITIONER

## 2024-08-21 RX ORDER — METOPROLOL SUCCINATE 100 MG/1
100 TABLET, EXTENDED RELEASE ORAL DAILY
Qty: 90 TABLET | Refills: 3 | Status: SHIPPED | OUTPATIENT
Start: 2024-08-21 | End: 2025-08-21

## 2024-08-21 RX ORDER — LEVOTHYROXINE SODIUM 137 UG/1
137 TABLET ORAL
Qty: 90 TABLET | Refills: 1 | Status: SHIPPED | OUTPATIENT
Start: 2024-08-21

## 2024-08-21 RX ORDER — DOXYCYCLINE 100 MG/1
100 CAPSULE ORAL 2 TIMES DAILY
Qty: 28 CAPSULE | Refills: 0 | Status: SHIPPED | OUTPATIENT
Start: 2024-08-21 | End: 2024-09-04

## 2024-08-21 NOTE — PROGRESS NOTES
"Subjective   Patient ID: Cady Haynes is a 69 y.o. female who presents for Follow-up (Bood in urine twice now, last month and recently on 8/18./Concerns with cellulitis in right leg and over all not feeling well. ).    16-year-old female here for acute visit.  She states that 5 days ago started having GI symptoms- nausea, diarrhea. She was traveling from [place]. Noted bright red blood in the urine. That has resolved. No fever/chills. Feels fatigued. HA.   Chronic fatigue- no change in the fatigue.   Has a HX LE cellulitis and she thinks her current s&s are related to acute cellulitis  She noticed a reddened area on RT inner knee. Its painful. Did not see a bug but knows there are a lot of ticks in [place]. Started as an inflamed circular raised rash. It has since spread  Urinalysis in office showed trace leukocytes, trace blood, negative nitrates         Review of Systems    Objective   /88   Pulse 65   Ht 1.626 m (5' 4\")   Wt 69.4 kg (153 lb)   SpO2 97%   BMI 26.26 kg/m²     Physical Exam  Constitutional:       Appearance: Normal appearance. She is obese.   Cardiovascular:      Rate and Rhythm: Normal rate and regular rhythm.      Pulses: Normal pulses.   Pulmonary:      Effort: Pulmonary effort is normal.      Breath sounds: Normal breath sounds.   Skin:     General: Skin is warm.             Comments: Multiple erythematous, raised nodular areas surrounding an annular rash. Non tender, no drainage. There is a very small black dot in the center that is not firm and unable to be removed.    Neurological:      General: No focal deficit present.      Mental Status: She is alert and oriented to person, place, and time. Mental status is at baseline.   Psychiatric:         Mood and Affect: Mood normal.         Behavior: Behavior normal.         Assessment/Plan   Diagnoses and all orders for this visit:  Cellulitis of right lower extremity  Comments:  Appears to be some form of a bug bite reaction.  Unsure " if there was a tick.  I do not see remnants of an insect  Orders:  -     doxycycline (Vibramycin) 100 mg capsule; Take 1 capsule (100 mg) by mouth 2 times a day for 14 days. Take with at least 8 ounces (large glass) of water, do not lie down for 30 minutes after  PVC (premature ventricular contraction)  Comments:  Needs a refill on her beta-blocker  Orders:  -     metoprolol succinate XL (Toprol-XL) 100 mg 24 hr tablet; Take 1 tablet (100 mg) by mouth once daily. Do not crush or chew.  Hematuria, unspecified type  Comments:  Resolved.  There is trace blood in her urinalysis  Orders:  -     POCT UA Automated manually resulted  Urinary tract infection symptoms  Comments:  Urinalysis is not quite clear if positive for UTI.  Abnormal results could be from travel, sitting too long, irritation.  Will send a urine culture  Orders:  -     POCT UA Automated manually resulted  -     Urine Culture; Future  Other orders  -     Follow Up In Primary Care - Established; Future

## 2024-08-23 ENCOUNTER — HOSPITAL ENCOUNTER (OUTPATIENT)
Dept: RADIOLOGY | Facility: CLINIC | Age: 69
Discharge: HOME | End: 2024-08-23
Payer: MEDICARE

## 2024-08-23 DIAGNOSIS — Z78.0 ASYMPTOMATIC AGE-RELATED POSTMENOPAUSAL STATE: ICD-10-CM

## 2024-08-23 PROCEDURE — 77080 DXA BONE DENSITY AXIAL: CPT

## 2024-08-23 ASSESSMENT — LIFESTYLE VARIABLES
3_OR_MORE_DRINKS_PER_DAY: N
CURRENT_SMOKER: N

## 2024-08-24 LAB — BACTERIA UR CULT: ABNORMAL

## 2024-08-27 ENCOUNTER — LAB (OUTPATIENT)
Dept: LAB | Facility: LAB | Age: 69
End: 2024-08-27
Payer: MEDICARE

## 2024-08-27 ENCOUNTER — OFFICE VISIT (OUTPATIENT)
Dept: CARDIOLOGY | Facility: CLINIC | Age: 69
End: 2024-08-27
Payer: MEDICARE

## 2024-08-27 VITALS
DIASTOLIC BLOOD PRESSURE: 68 MMHG | HEART RATE: 68 BPM | OXYGEN SATURATION: 98 % | WEIGHT: 157 LBS | SYSTOLIC BLOOD PRESSURE: 148 MMHG | BODY MASS INDEX: 26.95 KG/M2

## 2024-08-27 DIAGNOSIS — R94.39 ABNORMAL STRESS TEST: ICD-10-CM

## 2024-08-27 DIAGNOSIS — I42.8 NONISCHEMIC CARDIOMYOPATHY (MULTI): ICD-10-CM

## 2024-08-27 DIAGNOSIS — I20.0 ANGINA PECTORIS, UNSTABLE (MULTI): Primary | ICD-10-CM

## 2024-08-27 DIAGNOSIS — I10 ESSENTIAL HYPERTENSION: ICD-10-CM

## 2024-08-27 LAB
ANION GAP SERPL CALC-SCNC: 13 MMOL/L (ref 10–20)
BUN SERPL-MCNC: 17 MG/DL (ref 6–23)
CALCIUM SERPL-MCNC: 9.5 MG/DL (ref 8.6–10.3)
CHLORIDE SERPL-SCNC: 104 MMOL/L (ref 98–107)
CO2 SERPL-SCNC: 31 MMOL/L (ref 21–32)
CREAT SERPL-MCNC: 0.59 MG/DL (ref 0.5–1.05)
EGFRCR SERPLBLD CKD-EPI 2021: >90 ML/MIN/1.73M*2
ERYTHROCYTE [DISTWIDTH] IN BLOOD BY AUTOMATED COUNT: 14.7 % (ref 11.5–14.5)
GLUCOSE SERPL-MCNC: 96 MG/DL (ref 74–99)
HCT VFR BLD AUTO: 42.5 % (ref 36–46)
HGB BLD-MCNC: 13.5 G/DL (ref 12–16)
MCH RBC QN AUTO: 28.1 PG (ref 26–34)
MCHC RBC AUTO-ENTMCNC: 31.8 G/DL (ref 32–36)
MCV RBC AUTO: 89 FL (ref 80–100)
NRBC BLD-RTO: 0 /100 WBCS (ref 0–0)
PLATELET # BLD AUTO: 234 X10*3/UL (ref 150–450)
POTASSIUM SERPL-SCNC: 3.9 MMOL/L (ref 3.5–5.3)
RBC # BLD AUTO: 4.8 X10*6/UL (ref 4–5.2)
SODIUM SERPL-SCNC: 144 MMOL/L (ref 136–145)
WBC # BLD AUTO: 8.6 X10*3/UL (ref 4.4–11.3)

## 2024-08-27 PROCEDURE — 1157F ADVNC CARE PLAN IN RCRD: CPT | Performed by: INTERNAL MEDICINE

## 2024-08-27 PROCEDURE — 99215 OFFICE O/P EST HI 40 MIN: CPT | Performed by: INTERNAL MEDICINE

## 2024-08-27 PROCEDURE — 80048 BASIC METABOLIC PNL TOTAL CA: CPT

## 2024-08-27 PROCEDURE — 3077F SYST BP >= 140 MM HG: CPT | Performed by: INTERNAL MEDICINE

## 2024-08-27 PROCEDURE — 3078F DIAST BP <80 MM HG: CPT | Performed by: INTERNAL MEDICINE

## 2024-08-27 PROCEDURE — 85027 COMPLETE CBC AUTOMATED: CPT

## 2024-08-27 PROCEDURE — 1123F ACP DISCUSS/DSCN MKR DOCD: CPT | Performed by: INTERNAL MEDICINE

## 2024-08-27 PROCEDURE — 36415 COLL VENOUS BLD VENIPUNCTURE: CPT

## 2024-08-27 NOTE — PROGRESS NOTES
Chief Complaint:   No chief complaint on file.     History Of Present Illness:    Cady Haynes is a 69 y.o. female presenting for follow-up.  Noted a few episodes of chest pressure on exertion when she was in [place] over the summer.  Has had progressive dyspnea with decreased exercise tolerance as well.  Palpitations are much better controlled overall.     Last Recorded Vitals:  Vitals:    08/27/24 0843   BP: 148/68   Pulse: 68   SpO2: 98%   Weight: 71.2 kg (157 lb)       Past Medical History:  She has a past medical history of Acetabular fracture (Multi) (09/13/2023), Acute upper respiratory infection, unspecified (12/10/2014), Candidiasis of skin and nail (06/10/2021), Cardiomyopathy (Multi) (09/13/2023), Cardiomyopathy, unspecified (Multi), Cellulitis of unspecified part of limb (06/16/2014), Cellulitis, unspecified, Contusion (09/13/2023), Contusion of unspecified front wall of thorax, initial encounter (06/16/2014), Contusion of unspecified lower leg, initial encounter (10/13/2021), Effusion, unspecified ankle (10/15/2019), Encounter for general adult medical examination without abnormal findings (12/09/2015), Encounter for immunization (12/09/2015), Encounter for other preprocedural examination (12/07/2020), Encounter for other specified surgical aftercare (12/06/2017), Encounter for preprocedural cardiovascular examination (11/30/2020), Encounter for preprocedural laboratory examination (11/22/2021), Enlarged lymph nodes, unspecified (04/02/2019), Fall from horse (09/13/2023), Fracture of thoracic spine (Multi) (09/13/2023), Hamstring injury (09/13/2023), Hypercalcemia (09/13/2023), Impaired fasting glucose (02/08/2018), Infection and inflammatory reaction due to other internal prosthetic devices, implants and grafts, initial encounter (CMS-[facility]) (06/17/2018), Injury of left knee (09/13/2023), Insect bite (nonvenomous), left ankle, initial encounter (CODE) (08/31/2020), Left acoustic neuroma (Multi)  (09/13/2023), Menopausal symptom (09/13/2023), Metabolic syndrome (02/25/2015), NICM (nonischemic cardiomyopathy) (Multi) (09/13/2023), Non-Hodgkin lymphoma, unspecified, unspecified site (Multi), Non-Hodgkin's lymphoma (Multi) (09/13/2023), Osteopenia (09/13/2023), Other conditions influencing health status, Other conditions influencing health status (11/06/2012), Other reduced mobility (03/20/2020), Pain in left hip (12/15/2020), Pain in left knee (12/23/2019), Paroxysmal supraventricular tachycardia (CMS-HCC) (09/13/2023), Personal history of diseases of the skin and subcutaneous tissue, Personal history of other diseases of the circulatory system, Personal history of other diseases of the musculoskeletal system and connective tissue (10/27/2017), Personal history of other diseases of the respiratory system, Personal history of other drug therapy (11/06/2012), Personal history of other endocrine, nutritional and metabolic disease (09/12/2018), Personal history of other mental and behavioral disorders (12/04/2013), Personal history of other specified conditions (09/26/2014), Personal history of other specified conditions (08/27/2013), Personal history of other specified conditions (10/27/2017), 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    Past Surgical History:  She has a past surgical history that includes Tonsillectomy (11/06/2012) and Other surgical history (11/06/2012).      Social History:  She reports that she has never smoked. She has never used smokeless tobacco. She reports current alcohol use. She reports that she does not use drugs.    Family History:  No family history on file.     Allergies:  Patient has no known  allergies.    Outpatient Medications:  Current Outpatient Medications   Medication Instructions    alpha tocopherol (Vitamin E) 670 mg (1,000 unit) capsule 1 capsule, oral, Daily    atorvastatin (LIPITOR) 20 mg, oral, Every 24 hours    CALCIUM CITRATE-VITAMIN D2 ORAL 1 tablet, oral, Daily    clotrimazole (Lotrimin) 1 % cream Topical, 2 times daily, to affected area    doxycycline (VIBRAMYCIN) 100 mg, oral, 2 times daily, Take with at least 8 ounces (large glass) of water, do not lie down for 30 minutes after    levothyroxine (SYNTHROID, LEVOXYL) 137 mcg, oral, Daily before breakfast    losartan-hydrochlorothiazide (Hyzaar) 100-25 mg tablet 1 tablet, oral, Daily    metoprolol succinate XL (TOPROL-XL) 100 mg, oral, Daily, Do not crush or chew.    multivitamin tablet 1 tablet, oral, Daily       Physical Exam:  Constitutional:       Appearance: Healthy appearance. Not in distress.   Neck:      Vascular: No JVR. JVD normal.   Pulmonary:      Effort: Pulmonary effort is normal.      Breath sounds: Normal breath sounds. No wheezing. No rhonchi. No rales.   Chest:      Chest wall: Not tender to palpatation.   Cardiovascular:      Normal rate. Regular rhythm.      Murmurs: There is no murmur.   Edema:     Peripheral edema absent.   Abdominal:      General: Bowel sounds are normal.      Palpations: Abdomen is soft.      Tenderness: There is no abdominal tenderness.   Musculoskeletal: Normal range of motion. Skin:     General: Skin is warm and dry.   Neurological:      General: No focal deficit present.      Mental Status: Alert and oriented to person, place and time.           Last Labs:  CBC -  Lab Results   Component Value Date    WBC 20.7 (H) 09/11/2023    HGB 15.6 09/11/2023    HCT 46.4 (H) 09/11/2023    MCV 86 09/11/2023     09/11/2023       CMP -  Lab Results   Component Value Date    CALCIUM 10.3 09/11/2023    PHOS 2.7 09/15/2020    PROT 6.5 09/11/2023    ALBUMIN 4.5 09/11/2023    ALBUMIN 4.3 02/26/2021    AST  25 09/11/2023    ALT 36 09/11/2023    ALKPHOS 90 09/11/2023    BILITOT 1.1 09/11/2023       LIPID PANEL -   Lab Results   Component Value Date    CHOL 143 09/15/2023    TRIG 157 (H) 09/15/2023    HDL 46.0 09/15/2023    CHHDL 3.1 09/15/2023    LDLF 66 09/15/2023    VLDL 31 09/15/2023       RENAL FUNCTION PANEL -   Lab Results   Component Value Date    GLUCOSE 103 (H) 09/11/2023     09/11/2023    K 3.4 (L) 09/11/2023     09/11/2023    CO2 30 09/11/2023    ANIONGAP 15 09/11/2023    BUN 21 09/11/2023    CREATININE 0.73 09/11/2023    CALCIUM 10.3 09/11/2023    PHOS 2.7 09/15/2020    ALBUMIN 4.5 09/11/2023    ALBUMIN 4.3 02/26/2021        Lab Results   Component Value Date    BNP 19 02/16/2018    HGBA1C 5.2 02/16/2018       Last Cardiology Tests:  ECG independently reviewed from today: Sinus rhythm, rate 70, LVH    Stress test 3/19/24:   1. ECG changes consistent with ischemia.   2. Submaximal level of stress achieved.   3. No Ectopy noted.    Echo 3/19/24:   1. Left ventricular systolic function is low normal with a 55% estimated ejection fraction.   2. Spectral Doppler shows an impaired relaxation pattern of left ventricular diastolic filling.   3. There is mild mitral, tricuspid and pulmonic regurgitation.     ZIO March 2023:  Patient had a min HR of 58 bpm, max HR of 124 bpm, and avg HR of 79  bpm. Predominant underlying rhythm was Sinus Rhythm. Bundle Branch  Block/IVCD was present. 1 run of Ventricular Tachycardia occurred lasting  5 beats with a max rate of 124 bpm (avg 103 bpm). Isolated SVEs were  occasional (1.2%, 8239), SVE Couplets were rare (<1.0%, 21), and no SVE  Triplets were present. Isolated VEs were rare (<1.0%), and no VE Couplets  or VE Triplets were present.     Stress nuclear 11/2020:  1. Normal myocardial perfusion study without evidence of ischemia or  prior infarction.  2. The left ventricle is normal in size.  3. Normal LV wall motion with an LV EF estimated at greater than  55%.    Assessment/Plan   Very pleasant 69 year-old female with HTN, dyslipidemia and remote history of nonischemic cardiomyopathy due to chemotherapy, ejection fraction has since normalized with medical therapy.      We had her do a stress test in the spring which showed some nonspecific ST changes in the setting of an IVCD.  Since that time, she is having symptoms concerning for new onset angina with some episodes of chest pressure on exertion, increased dyspnea and decreased exercise tolerance.  Plan for coronary angiography given symptoms and abnormal stress test for ischemic evaluation.  Will continue current medications for now and titrate based on cath results.      Desmond Saeed MD

## 2024-08-27 NOTE — H&P (VIEW-ONLY)
Chief Complaint:   No chief complaint on file.     History Of Present Illness:    Cady Haynes is a 69 y.o. female presenting for follow-up.  Noted a few episodes of chest pressure on exertion when she was in [place] over the summer.  Has had progressive dyspnea with decreased exercise tolerance as well.  Palpitations are much better controlled overall.     Last Recorded Vitals:  Vitals:    08/27/24 0843   BP: 148/68   Pulse: 68   SpO2: 98%   Weight: 71.2 kg (157 lb)       Past Medical History:  She has a past medical history of Acetabular fracture (Multi) (09/13/2023), Acute upper respiratory infection, unspecified (12/10/2014), Candidiasis of skin and nail (06/10/2021), Cardiomyopathy (Multi) (09/13/2023), Cardiomyopathy, unspecified (Multi), Cellulitis of unspecified part of limb (06/16/2014), Cellulitis, unspecified, Contusion (09/13/2023), Contusion of unspecified front wall of thorax, initial encounter (06/16/2014), Contusion of unspecified lower leg, initial encounter (10/13/2021), Effusion, unspecified ankle (10/15/2019), Encounter for general adult medical examination without abnormal findings (12/09/2015), Encounter for immunization (12/09/2015), Encounter for other preprocedural examination (12/07/2020), Encounter for other specified surgical aftercare (12/06/2017), Encounter for preprocedural cardiovascular examination (11/30/2020), Encounter for preprocedural laboratory examination (11/22/2021), Enlarged lymph nodes, unspecified (04/02/2019), Fall from horse (09/13/2023), Fracture of thoracic spine (Multi) (09/13/2023), Hamstring injury (09/13/2023), Hypercalcemia (09/13/2023), Impaired fasting glucose (02/08/2018), Infection and inflammatory reaction due to other internal prosthetic devices, implants and grafts, initial encounter (CMS-[facility]) (06/17/2018), Injury of left knee (09/13/2023), Insect bite (nonvenomous), left ankle, initial encounter (CODE) (08/31/2020), Left acoustic neuroma (Multi)  (09/13/2023), Menopausal symptom (09/13/2023), Metabolic syndrome (02/25/2015), NICM (nonischemic cardiomyopathy) (Multi) (09/13/2023), Non-Hodgkin lymphoma, unspecified, unspecified site (Multi), Non-Hodgkin's lymphoma (Multi) (09/13/2023), Osteopenia (09/13/2023), Other conditions influencing health status, Other conditions influencing health status (11/06/2012), Other reduced mobility (03/20/2020), Pain in left hip (12/15/2020), Pain in left knee (12/23/2019), Paroxysmal supraventricular tachycardia (CMS-HCC) (09/13/2023), Personal history of diseases of the skin and subcutaneous tissue, Personal history of other diseases of the circulatory system, Personal history of other diseases of the musculoskeletal system and connective tissue (10/27/2017), Personal history of other diseases of the respiratory system, Personal history of other drug therapy (11/06/2012), Personal history of other endocrine, nutritional and metabolic disease (09/12/2018), Personal history of other mental and behavioral disorders (12/04/2013), Personal history of other specified conditions (09/26/2014), Personal history of other specified conditions (08/27/2013), Personal history of other specified conditions (10/27/2017), 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    Past Surgical History:  She has a past surgical history that includes Tonsillectomy (11/06/2012) and Other surgical history (11/06/2012).      Social History:  She reports that she has never smoked. She has never used smokeless tobacco. She reports current alcohol use. She reports that she does not use drugs.    Family History:  No family history on file.     Allergies:  Patient has no known  allergies.    Outpatient Medications:  Current Outpatient Medications   Medication Instructions    alpha tocopherol (Vitamin E) 670 mg (1,000 unit) capsule 1 capsule, oral, Daily    atorvastatin (LIPITOR) 20 mg, oral, Every 24 hours    CALCIUM CITRATE-VITAMIN D2 ORAL 1 tablet, oral, Daily    clotrimazole (Lotrimin) 1 % cream Topical, 2 times daily, to affected area    doxycycline (VIBRAMYCIN) 100 mg, oral, 2 times daily, Take with at least 8 ounces (large glass) of water, do not lie down for 30 minutes after    levothyroxine (SYNTHROID, LEVOXYL) 137 mcg, oral, Daily before breakfast    losartan-hydrochlorothiazide (Hyzaar) 100-25 mg tablet 1 tablet, oral, Daily    metoprolol succinate XL (TOPROL-XL) 100 mg, oral, Daily, Do not crush or chew.    multivitamin tablet 1 tablet, oral, Daily       Physical Exam:  Constitutional:       Appearance: Healthy appearance. Not in distress.   Neck:      Vascular: No JVR. JVD normal.   Pulmonary:      Effort: Pulmonary effort is normal.      Breath sounds: Normal breath sounds. No wheezing. No rhonchi. No rales.   Chest:      Chest wall: Not tender to palpatation.   Cardiovascular:      Normal rate. Regular rhythm.      Murmurs: There is no murmur.   Edema:     Peripheral edema absent.   Abdominal:      General: Bowel sounds are normal.      Palpations: Abdomen is soft.      Tenderness: There is no abdominal tenderness.   Musculoskeletal: Normal range of motion. Skin:     General: Skin is warm and dry.   Neurological:      General: No focal deficit present.      Mental Status: Alert and oriented to person, place and time.           Last Labs:  CBC -  Lab Results   Component Value Date    WBC 20.7 (H) 09/11/2023    HGB 15.6 09/11/2023    HCT 46.4 (H) 09/11/2023    MCV 86 09/11/2023     09/11/2023       CMP -  Lab Results   Component Value Date    CALCIUM 10.3 09/11/2023    PHOS 2.7 09/15/2020    PROT 6.5 09/11/2023    ALBUMIN 4.5 09/11/2023    ALBUMIN 4.3 02/26/2021    AST  25 09/11/2023    ALT 36 09/11/2023    ALKPHOS 90 09/11/2023    BILITOT 1.1 09/11/2023       LIPID PANEL -   Lab Results   Component Value Date    CHOL 143 09/15/2023    TRIG 157 (H) 09/15/2023    HDL 46.0 09/15/2023    CHHDL 3.1 09/15/2023    LDLF 66 09/15/2023    VLDL 31 09/15/2023       RENAL FUNCTION PANEL -   Lab Results   Component Value Date    GLUCOSE 103 (H) 09/11/2023     09/11/2023    K 3.4 (L) 09/11/2023     09/11/2023    CO2 30 09/11/2023    ANIONGAP 15 09/11/2023    BUN 21 09/11/2023    CREATININE 0.73 09/11/2023    CALCIUM 10.3 09/11/2023    PHOS 2.7 09/15/2020    ALBUMIN 4.5 09/11/2023    ALBUMIN 4.3 02/26/2021        Lab Results   Component Value Date    BNP 19 02/16/2018    HGBA1C 5.2 02/16/2018       Last Cardiology Tests:  ECG independently reviewed from today: Sinus rhythm, rate 70, LVH    Stress test 3/19/24:   1. ECG changes consistent with ischemia.   2. Submaximal level of stress achieved.   3. No Ectopy noted.    Echo 3/19/24:   1. Left ventricular systolic function is low normal with a 55% estimated ejection fraction.   2. Spectral Doppler shows an impaired relaxation pattern of left ventricular diastolic filling.   3. There is mild mitral, tricuspid and pulmonic regurgitation.     ZIO March 2023:  Patient had a min HR of 58 bpm, max HR of 124 bpm, and avg HR of 79  bpm. Predominant underlying rhythm was Sinus Rhythm. Bundle Branch  Block/IVCD was present. 1 run of Ventricular Tachycardia occurred lasting  5 beats with a max rate of 124 bpm (avg 103 bpm). Isolated SVEs were  occasional (1.2%, 8239), SVE Couplets were rare (<1.0%, 21), and no SVE  Triplets were present. Isolated VEs were rare (<1.0%), and no VE Couplets  or VE Triplets were present.     Stress nuclear 11/2020:  1. Normal myocardial perfusion study without evidence of ischemia or  prior infarction.  2. The left ventricle is normal in size.  3. Normal LV wall motion with an LV EF estimated at greater than  55%.    Assessment/Plan   Very pleasant 69 year-old female with HTN, dyslipidemia and remote history of nonischemic cardiomyopathy due to chemotherapy, ejection fraction has since normalized with medical therapy.      We had her do a stress test in the spring which showed some nonspecific ST changes in the setting of an IVCD.  Since that time, she is having symptoms concerning for new onset angina with some episodes of chest pressure on exertion, increased dyspnea and decreased exercise tolerance.  Plan for coronary angiography given symptoms and abnormal stress test for ischemic evaluation.  Will continue current medications for now and titrate based on cath results.      Desmond Saeed MD

## 2024-08-29 DIAGNOSIS — N30.00 ACUTE CYSTITIS WITHOUT HEMATURIA: Primary | ICD-10-CM

## 2024-08-29 RX ORDER — AMOXICILLIN AND CLAVULANATE POTASSIUM 500; 125 MG/1; MG/1
500 TABLET, FILM COATED ORAL 2 TIMES DAILY
Qty: 20 TABLET | Refills: 0 | Status: SHIPPED | OUTPATIENT
Start: 2024-08-29 | End: 2024-09-08

## 2024-09-06 ENCOUNTER — HOSPITAL ENCOUNTER (OUTPATIENT)
Facility: HOSPITAL | Age: 69
Setting detail: OUTPATIENT SURGERY
Discharge: HOME | End: 2024-09-06
Attending: INTERNAL MEDICINE | Admitting: INTERNAL MEDICINE
Payer: MEDICARE

## 2024-09-06 VITALS
SYSTOLIC BLOOD PRESSURE: 139 MMHG | DIASTOLIC BLOOD PRESSURE: 63 MMHG | OXYGEN SATURATION: 96 % | HEART RATE: 60 BPM | RESPIRATION RATE: 17 BRPM | WEIGHT: 153 LBS | BODY MASS INDEX: 26.26 KG/M2

## 2024-09-06 DIAGNOSIS — I20.9 ANGINA PECTORIS, UNSPECIFIED (CMS-HCC): ICD-10-CM

## 2024-09-06 DIAGNOSIS — R94.39 ABNORMAL STRESS TEST: ICD-10-CM

## 2024-09-06 DIAGNOSIS — R94.30 ABNORMAL RESULT OF CARDIOVASCULAR FUNCTION STUDY, UNSPECIFIED: ICD-10-CM

## 2024-09-06 DIAGNOSIS — I20.0 ANGINA PECTORIS, UNSTABLE (MULTI): ICD-10-CM

## 2024-09-06 PROCEDURE — 7100000009 HC PHASE TWO TIME - INITIAL BASE CHARGE: Performed by: INTERNAL MEDICINE

## 2024-09-06 PROCEDURE — 93458 L HRT ARTERY/VENTRICLE ANGIO: CPT | Performed by: INTERNAL MEDICINE

## 2024-09-06 PROCEDURE — C1760 CLOSURE DEV, VASC: HCPCS | Performed by: INTERNAL MEDICINE

## 2024-09-06 PROCEDURE — C1894 INTRO/SHEATH, NON-LASER: HCPCS | Performed by: INTERNAL MEDICINE

## 2024-09-06 PROCEDURE — 2500000004 HC RX 250 GENERAL PHARMACY W/ HCPCS (ALT 636 FOR OP/ED): Performed by: INTERNAL MEDICINE

## 2024-09-06 PROCEDURE — C1887 CATHETER, GUIDING: HCPCS | Performed by: INTERNAL MEDICINE

## 2024-09-06 PROCEDURE — 2500000005 HC RX 250 GENERAL PHARMACY W/O HCPCS: Performed by: INTERNAL MEDICINE

## 2024-09-06 PROCEDURE — 2550000001 HC RX 255 CONTRASTS: Performed by: INTERNAL MEDICINE

## 2024-09-06 PROCEDURE — 96373 THER/PROPH/DIAG INJ IA: CPT | Performed by: INTERNAL MEDICINE

## 2024-09-06 PROCEDURE — 2720000007 HC OR 272 NO HCPCS: Performed by: INTERNAL MEDICINE

## 2024-09-06 PROCEDURE — 7100000010 HC PHASE TWO TIME - EACH INCREMENTAL 1 MINUTE: Performed by: INTERNAL MEDICINE

## 2024-09-06 RX ORDER — LIDOCAINE HYDROCHLORIDE 20 MG/ML
INJECTION, SOLUTION EPIDURAL; INFILTRATION; INTRACAUDAL; PERINEURAL AS NEEDED
Status: DISCONTINUED | OUTPATIENT
Start: 2024-09-06 | End: 2024-09-06 | Stop reason: HOSPADM

## 2024-09-06 RX ORDER — NITROGLYCERIN 40 MG/100ML
INJECTION INTRAVENOUS AS NEEDED
Status: DISCONTINUED | OUTPATIENT
Start: 2024-09-06 | End: 2024-09-06 | Stop reason: HOSPADM

## 2024-09-06 RX ORDER — HEPARIN SODIUM 1000 [USP'U]/ML
INJECTION, SOLUTION INTRAVENOUS; SUBCUTANEOUS AS NEEDED
Status: DISCONTINUED | OUTPATIENT
Start: 2024-09-06 | End: 2024-09-06 | Stop reason: HOSPADM

## 2024-09-06 RX ORDER — VERAPAMIL HYDROCHLORIDE 2.5 MG/ML
INJECTION, SOLUTION INTRAVENOUS AS NEEDED
Status: DISCONTINUED | OUTPATIENT
Start: 2024-09-06 | End: 2024-09-06 | Stop reason: HOSPADM

## 2024-09-06 RX ORDER — FENTANYL CITRATE 50 UG/ML
INJECTION, SOLUTION INTRAMUSCULAR; INTRAVENOUS AS NEEDED
Status: DISCONTINUED | OUTPATIENT
Start: 2024-09-06 | End: 2024-09-06 | Stop reason: HOSPADM

## 2024-09-06 RX ORDER — MIDAZOLAM HYDROCHLORIDE 1 MG/ML
INJECTION, SOLUTION INTRAMUSCULAR; INTRAVENOUS AS NEEDED
Status: DISCONTINUED | OUTPATIENT
Start: 2024-09-06 | End: 2024-09-06 | Stop reason: HOSPADM

## 2024-09-06 RX ORDER — SODIUM CHLORIDE 9 MG/ML
INJECTION, SOLUTION INTRAVENOUS CONTINUOUS PRN
Status: COMPLETED | OUTPATIENT
Start: 2024-09-06 | End: 2024-09-06

## 2024-09-06 ASSESSMENT — PAIN SCALES - GENERAL
PAINLEVEL_OUTOF10: 0 - NO PAIN

## 2024-09-06 ASSESSMENT — PAIN - FUNCTIONAL ASSESSMENT
PAIN_FUNCTIONAL_ASSESSMENT: 0-10

## 2024-09-06 ASSESSMENT — COLUMBIA-SUICIDE SEVERITY RATING SCALE - C-SSRS
6. HAVE YOU EVER DONE ANYTHING, STARTED TO DO ANYTHING, OR PREPARED TO DO ANYTHING TO END YOUR LIFE?: NO
2. HAVE YOU ACTUALLY HAD ANY THOUGHTS OF KILLING YOURSELF?: NO
1. IN THE PAST MONTH, HAVE YOU WISHED YOU WERE DEAD OR WISHED YOU COULD GO TO SLEEP AND NOT WAKE UP?: NO

## 2024-09-06 NOTE — SIGNIFICANT EVENT
Pt returned to [facility] from lab 1, right radial band in place, pt resting comfortably, sitting up, drinking coffee and eating cookies

## 2024-09-09 ENCOUNTER — APPOINTMENT (OUTPATIENT)
Dept: OBSTETRICS AND GYNECOLOGY | Facility: CLINIC | Age: 69
End: 2024-09-09
Payer: MEDICARE

## 2024-09-09 NOTE — SIGNIFICANT EVENT
Cath Lab Procedure Call Back  Procedure Date: _9/6/24_________   Procedure Performed:_LHC___________________  Physician:_Stefano__________  Spoke with:_Patient________________________  Date/Time Contacted: 1st attempt: _________ ___:____ 2nd attempt: _________ ___:____  Phone#:_______________ Unable to reach/Left message:____________  Access Site: Pain______Bleeding______Bruised______Infection______WNL__Y____  Dressing Removal Date:_9/7/24__________ Anticoagulation Post Intervention_________  Satisfied with Care:__Y______________ D/C Instructions adequate__Y_____________  Follow Up Appointment:__Y___________________ Length of Call:__________________  Comments:______________________________________________________________________________________________________________________________________________

## 2024-09-10 ENCOUNTER — OFFICE VISIT (OUTPATIENT)
Dept: OBSTETRICS AND GYNECOLOGY | Facility: CLINIC | Age: 69
End: 2024-09-10
Payer: MEDICARE

## 2024-09-10 VITALS
HEIGHT: 64 IN | WEIGHT: 159 LBS | DIASTOLIC BLOOD PRESSURE: 71 MMHG | BODY MASS INDEX: 27.14 KG/M2 | SYSTOLIC BLOOD PRESSURE: 132 MMHG

## 2024-09-10 DIAGNOSIS — Z12.31 ENCOUNTER FOR SCREENING MAMMOGRAM FOR MALIGNANT NEOPLASM OF BREAST: ICD-10-CM

## 2024-09-10 DIAGNOSIS — M81.0 AGE-RELATED OSTEOPOROSIS WITHOUT CURRENT PATHOLOGICAL FRACTURE: ICD-10-CM

## 2024-09-10 DIAGNOSIS — N89.8 VAGINAL IRRITATION: ICD-10-CM

## 2024-09-10 DIAGNOSIS — N95.1 MENOPAUSAL SYMPTOM: Primary | ICD-10-CM

## 2024-09-10 PROCEDURE — 1159F MED LIST DOCD IN RCRD: CPT

## 2024-09-10 PROCEDURE — 1123F ACP DISCUSS/DSCN MKR DOCD: CPT

## 2024-09-10 PROCEDURE — 1160F RVW MEDS BY RX/DR IN RCRD: CPT

## 2024-09-10 PROCEDURE — 3075F SYST BP GE 130 - 139MM HG: CPT

## 2024-09-10 PROCEDURE — 3008F BODY MASS INDEX DOCD: CPT

## 2024-09-10 PROCEDURE — 1126F AMNT PAIN NOTED NONE PRSNT: CPT

## 2024-09-10 PROCEDURE — 3078F DIAST BP <80 MM HG: CPT

## 2024-09-10 PROCEDURE — 99214 OFFICE O/P EST MOD 30 MIN: CPT

## 2024-09-10 PROCEDURE — 87205 SMEAR GRAM STAIN: CPT

## 2024-09-10 PROCEDURE — 1157F ADVNC CARE PLAN IN RCRD: CPT

## 2024-09-10 ASSESSMENT — ENCOUNTER SYMPTOMS
OCCASIONAL FEELINGS OF UNSTEADINESS: 0
DEPRESSION: 0
LOSS OF SENSATION IN FEET: 0

## 2024-09-10 ASSESSMENT — PATIENT HEALTH QUESTIONNAIRE - PHQ9
1. LITTLE INTEREST OR PLEASURE IN DOING THINGS: NOT AT ALL
SUM OF ALL RESPONSES TO PHQ9 QUESTIONS 1 & 2: 0
2. FEELING DOWN, DEPRESSED OR HOPELESS: NOT AT ALL

## 2024-09-10 ASSESSMENT — LIFESTYLE VARIABLES
HOW OFTEN DO YOU HAVE A DRINK CONTAINING ALCOHOL: MONTHLY OR LESS
HOW MANY STANDARD DRINKS CONTAINING ALCOHOL DO YOU HAVE ON A TYPICAL DAY: 1 OR 2
HOW OFTEN DO YOU HAVE SIX OR MORE DRINKS ON ONE OCCASION: NEVER
AUDIT-C TOTAL SCORE: 1
SKIP TO QUESTIONS 9-10: 1

## 2024-09-10 ASSESSMENT — PAIN SCALES - GENERAL: PAINLEVEL: 0-NO PAIN

## 2024-09-10 NOTE — PROGRESS NOTES
"Subjective   Cady Haynes is a 69 y.o. female who is here for a routine menopausal visit. Last saw Dr. Bal 2023. Denies vaginal bleeding. Denies pelvic pain, pressure, or bloating. Had an episode of blood in toilet bowl with urinating 2024; also associated vomiting and fever; states her urine was pink at the time; saw her PCP who suspected a tick bite based on her recent travels and she took doxycycline; culture pos for strep; followed by Augmentin rx; now with some vaginal itching. Denies abnormal discharge odor or burning. Also wanting to rvw DEXA results; hx of fractures; horse rides regularly; hx of oral steroid use when she underwent chemo; has previously been on Fosamax years ago through Dr. Bal; currently utilizing calcium supplement.    Complaints:   see hpi  HRT: no  History of abnormal Pap smear: no  History of abnormal mammogram: no      OB History          2    Para   2    Term   2            AB        Living   2         SAB        IAB        Ectopic        Multiple        Live Births   2                  Review of Systems   Constitutional:  Negative for chills, fatigue, fever and unexpected weight change.   Respiratory:  Negative for cough and shortness of breath.    Gastrointestinal:  Negative for abdominal pain, nausea and vomiting.   Genitourinary:  Negative for dyspareunia, dysuria, pelvic pain and vaginal discharge.        + vaginal itching   Skin:  Negative for color change and rash.   Neurological:  Negative for dizziness and headaches.       Objective   /71   Ht 1.626 m (5' 4\")   Wt 72.1 kg (159 lb)   BMI 27.29 kg/m²        General:   Alert and oriented, in no acute distress   Neck: Supple. No visible thyromegaly.    Breast/Axilla: Normal to palpation bilaterally without masses, skin changes, or nipple discharge.    Abdomen: Soft, non-tender, without masses or organomegaly   Vulva: Normal architecture without erythema, masses, or lesions.    Vagina: Normal mucosa " without lesions, masses. Positive atrophy. Urethral caruncle. Mild erythema at introitus. Swab obtained   Cervix: Normal without masses, lesions, or signs of cervicitis; protrudes about 1/2way down vaginal canal   Uterus: Normal, mobile, non-enlarged uterus   Adnexa: Normal without masses or lesions   Pelvic Floor Normal    Psych Normal affect. Normal mood.      Assessment/Plan   -No further need for pap based on age and history.    -Mammogram due 9/12/24, future order placed.  -Vaginal swab obtained to rule out yeast infection s/p abx use and based on her current sx, although improving.  -We rvwd her DEXA scan results; osteoporosis of RIGHT hip, metal hardware LEFT hip; she is considering medication mgt now as she continues to horseback ride and with hx of hypothyroidism, at greater risk for fracture. She has previously been on Fosamax. I encouraged she continue her calcium supplement until she is seen by PCP 12/2024 to follow up regarding results. Provided [facility] handout re: menopausal osteoporosis and med mgt options.     Alyx Cat PA-C

## 2024-09-11 LAB
BACTERIAL VAGINOSIS VAG-IMP: NORMAL
CLUE CELLS VAG LPF-#/AREA: NORMAL /[LPF]
NUGENT SCORE: 4
YEAST VAG WET PREP-#/AREA: NORMAL

## 2024-09-16 ENCOUNTER — APPOINTMENT (OUTPATIENT)
Dept: PRIMARY CARE | Facility: CLINIC | Age: 69
End: 2024-09-16
Payer: MEDICARE

## 2024-09-16 ASSESSMENT — ENCOUNTER SYMPTOMS
CHILLS: 0
COUGH: 0
DYSURIA: 0
DIZZINESS: 0
HEADACHES: 0
UNEXPECTED WEIGHT CHANGE: 0
FEVER: 0
COLOR CHANGE: 0
VOMITING: 0
FATIGUE: 0
NAUSEA: 0
SHORTNESS OF BREATH: 0
ABDOMINAL PAIN: 0

## 2024-09-17 ENCOUNTER — HOSPITAL ENCOUNTER (OUTPATIENT)
Dept: RADIOLOGY | Facility: CLINIC | Age: 69
Discharge: HOME | End: 2024-09-17
Payer: MEDICARE

## 2024-09-17 VITALS — WEIGHT: 157 LBS | BODY MASS INDEX: 26.8 KG/M2 | HEIGHT: 64 IN

## 2024-09-17 DIAGNOSIS — Z12.31 ENCOUNTER FOR SCREENING MAMMOGRAM FOR MALIGNANT NEOPLASM OF BREAST: ICD-10-CM

## 2024-09-17 PROCEDURE — 77063 BREAST TOMOSYNTHESIS BI: CPT | Performed by: RADIOLOGY

## 2024-09-17 PROCEDURE — 77067 SCR MAMMO BI INCL CAD: CPT | Performed by: RADIOLOGY

## 2024-09-17 PROCEDURE — 77067 SCR MAMMO BI INCL CAD: CPT

## 2024-10-02 DIAGNOSIS — E78.5 HYPERLIPIDEMIA, UNSPECIFIED HYPERLIPIDEMIA TYPE: ICD-10-CM

## 2024-10-02 RX ORDER — ATORVASTATIN CALCIUM 20 MG/1
20 TABLET, FILM COATED ORAL EVERY 24 HOURS
Qty: 90 TABLET | Refills: 3 | Status: SHIPPED | OUTPATIENT
Start: 2024-10-02

## 2024-11-14 ENCOUNTER — OFFICE VISIT (OUTPATIENT)
Dept: CARDIOLOGY | Facility: HOSPITAL | Age: 69
End: 2024-11-14
Payer: MEDICARE

## 2024-11-14 VITALS
OXYGEN SATURATION: 98 % | SYSTOLIC BLOOD PRESSURE: 165 MMHG | DIASTOLIC BLOOD PRESSURE: 92 MMHG | BODY MASS INDEX: 25.81 KG/M2 | HEART RATE: 78 BPM | WEIGHT: 150.35 LBS

## 2024-11-14 DIAGNOSIS — I51.89 DIASTOLIC DYSFUNCTION: Primary | ICD-10-CM

## 2024-11-14 DIAGNOSIS — I10 ESSENTIAL HYPERTENSION: ICD-10-CM

## 2024-11-14 LAB
ATRIAL RATE: 76 BPM
P AXIS: 67 DEGREES
P OFFSET: 214 MS
P ONSET: 158 MS
PR INTERVAL: 122 MS
Q ONSET: 219 MS
QRS COUNT: 12 BEATS
QRS DURATION: 126 MS
QT INTERVAL: 388 MS
QTC CALCULATION(BAZETT): 436 MS
QTC FREDERICIA: 419 MS
R AXIS: 16 DEGREES
T AXIS: 31 DEGREES
T OFFSET: 413 MS
VENTRICULAR RATE: 76 BPM

## 2024-11-14 PROCEDURE — 1157F ADVNC CARE PLAN IN RCRD: CPT | Performed by: INTERNAL MEDICINE

## 2024-11-14 PROCEDURE — 93005 ELECTROCARDIOGRAM TRACING: CPT | Performed by: INTERNAL MEDICINE

## 2024-11-14 PROCEDURE — 1123F ACP DISCUSS/DSCN MKR DOCD: CPT | Performed by: INTERNAL MEDICINE

## 2024-11-14 PROCEDURE — 99214 OFFICE O/P EST MOD 30 MIN: CPT | Performed by: INTERNAL MEDICINE

## 2024-11-14 PROCEDURE — 3077F SYST BP >= 140 MM HG: CPT | Performed by: INTERNAL MEDICINE

## 2024-11-14 PROCEDURE — 1159F MED LIST DOCD IN RCRD: CPT | Performed by: INTERNAL MEDICINE

## 2024-11-14 PROCEDURE — 99417 PROLNG OP E/M EACH 15 MIN: CPT | Performed by: INTERNAL MEDICINE

## 2024-11-14 PROCEDURE — 3080F DIAST BP >= 90 MM HG: CPT | Performed by: INTERNAL MEDICINE

## 2024-11-14 RX ORDER — SPIRONOLACTONE 25 MG/1
25 TABLET ORAL DAILY
Qty: 90 TABLET | Refills: 3 | Status: SHIPPED | OUTPATIENT
Start: 2024-11-14 | End: 2025-11-14

## 2024-11-14 NOTE — PROGRESS NOTES
Chief Complaint:   No chief complaint on file.     History Of Present Illness:    Cady Haynes is a 69 y.o. female presenting for follow-up.  She had a coronary angiography in September which showed no significant coronary disease.  Overall doing well from a cardiac standpoint--she has had a cold for the past week.  She has been able to take care of her horses, does note some shortness of breath on heavy exertion and some leg swelling.     Last Recorded Vitals:  Vitals:    11/14/24 0807   BP: (!) 165/92   Pulse: 78   SpO2: 98%   Weight: 68.2 kg (150 lb 5.7 oz)       Past Medical History:  She has a past medical history of Acetabular fracture (Multi) (09/13/2023), Acute upper respiratory infection, unspecified (12/10/2014), Candidiasis of skin and nail (06/10/2021), Cardiomyopathy (Multi) (09/13/2023), Cardiomyopathy, unspecified (Multi), Cellulitis of unspecified part of limb (06/16/2014), Cellulitis, unspecified, Contusion (09/13/2023), Contusion of unspecified front wall of thorax, initial encounter (06/16/2014), Contusion of unspecified lower leg, initial encounter (10/13/2021), Effusion, unspecified ankle (10/15/2019), Encounter for general adult medical examination without abnormal findings (12/09/2015), Encounter for immunization (12/09/2015), Encounter for other preprocedural examination (12/07/2020), Encounter for other specified surgical aftercare (12/06/2017), Encounter for preprocedural cardiovascular examination (11/30/2020), Encounter for preprocedural laboratory examination (11/22/2021), Enlarged lymph nodes, unspecified (04/02/2019), Fall from horse (09/13/2023), Fracture of thoracic spine (Multi) (09/13/2023), Hamstring injury (09/13/2023), Hypercalcemia (09/13/2023), Hyperlipidemia, Hypertension, Impaired fasting glucose (02/08/2018), Infection and inflammatory reaction due to other internal prosthetic devices, implants and grafts, initial encounter (CMS-HCC) (06/17/2018), Injury of left knee  (09/13/2023), Insect bite (nonvenomous), left ankle, initial encounter (CODE) (08/31/2020), Left acoustic neuroma (Multi) (09/13/2023), Menopausal symptom (09/13/2023), Metabolic syndrome (02/25/2015), NICM (nonischemic cardiomyopathy) (Multi) (09/13/2023), Non-Hodgkin lymphoma, unspecified, unspecified site (Multi), Non-Hodgkin's lymphoma (Multi) (09/13/2023), Osteopenia (09/13/2023), Other conditions influencing health status, Other conditions influencing health status (11/06/2012), Other reduced mobility (03/20/2020), Pain in left hip (12/15/2020), Pain in left knee (12/23/2019), Paroxysmal supraventricular tachycardia (CMS-HCC) (09/13/2023), Personal history of diseases of the skin and subcutaneous tissue, Personal history of other diseases of the circulatory system, Personal history of other diseases of the musculoskeletal system and connective tissue (10/27/2017), Personal history of other diseases of the respiratory system, Personal history of other drug therapy (11/06/2012), Personal history of other endocrine, nutritional and metabolic disease (09/12/2018), Personal history of other mental and behavioral disorders (12/04/2013), Personal history of other specified conditions (09/26/2014), Personal history of other specified conditions (08/27/2013), Personal history of other specified conditions (10/27/2017), 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    Past Surgical History:  She has a past surgical history that includes Tonsillectomy (11/06/2012); Other surgical history (11/06/2012); Cardiac catheterization (N/A, 09/06/2024); Lymph node biopsy (1998); Other surgical history (1999); Excision basal cell carcinoma (2001); Hip fracture  surgery (Left, 09/2019); and Revision total hip arthroplasty (Left, 12/2020).      Social History:  She reports that she has never smoked. She has never used smokeless tobacco. She reports current alcohol use. She reports that she does not use drugs.    Family History:  Family History   Problem Relation Name Age of Onset    Kidney failure Mother      COPD Mother      Arthritis Mother      Breast cancer Mother      Heart disease Mother      COPD Father      Diabetes Father      Heart failure Sister          Allergies:  Patient has no known allergies.    Outpatient Medications:  Current Outpatient Medications   Medication Instructions    alpha tocopherol (Vitamin E) 670 mg (1,000 unit) capsule 1 capsule, Daily    atorvastatin (LIPITOR) 20 mg, oral, Every 24 hours    CALCIUM CITRATE-VITAMIN D2 ORAL 1 tablet, Daily    clotrimazole (Lotrimin) 1 % cream 2 times daily    levothyroxine (SYNTHROID, LEVOXYL) 137 mcg, oral, Daily before breakfast    losartan-hydrochlorothiazide (Hyzaar) 100-25 mg tablet 1 tablet, oral, Daily    metoprolol succinate XL (TOPROL-XL) 100 mg, oral, Daily, Do not crush or chew.    multivitamin tablet 1 tablet, Daily       Physical Exam:  Constitutional:       Appearance: Healthy appearance. Not in distress.   Neck:      Vascular: No JVR. JVD normal.   Pulmonary:      Effort: Pulmonary effort is normal.      Breath sounds: Normal breath sounds. No wheezing. No rhonchi. No rales.   Chest:      Chest wall: Not tender to palpatation.   Cardiovascular:      Normal rate. Regular rhythm.      Murmurs: There is no murmur.   Edema:     Peripheral edema present.     Thigh: bilateral 1+ edema of the thigh.     Pretibial: bilateral 1+ edema of the pretibial area.     Ankle: bilateral 1+ edema of the ankle.     Feet: bilateral 1+ edema of the feet.  Abdominal:      General: Bowel sounds are normal.      Palpations: Abdomen is soft.      Tenderness: There is no abdominal tenderness.   Musculoskeletal: Normal  range of motion. Skin:     General: Skin is warm and dry.   Neurological:      General: No focal deficit present.      Mental Status: Alert and oriented to person, place and time.           Last Labs:  CBC -  Lab Results   Component Value Date    WBC 8.6 08/27/2024    HGB 13.5 08/27/2024    HCT 42.5 08/27/2024    MCV 89 08/27/2024     08/27/2024       CMP -  Lab Results   Component Value Date    CALCIUM 9.5 08/27/2024    PHOS 2.7 09/15/2020    PROT 6.5 09/11/2023    ALBUMIN 4.5 09/11/2023    ALBUMIN 4.3 02/26/2021    AST 25 09/11/2023    ALT 36 09/11/2023    ALKPHOS 90 09/11/2023    BILITOT 1.1 09/11/2023       LIPID PANEL -   Lab Results   Component Value Date    CHOL 143 09/15/2023    TRIG 157 (H) 09/15/2023    HDL 46.0 09/15/2023    CHHDL 3.1 09/15/2023    LDLF 66 09/15/2023    VLDL 31 09/15/2023       RENAL FUNCTION PANEL -   Lab Results   Component Value Date    GLUCOSE 96 08/27/2024     08/27/2024    K 3.9 08/27/2024     08/27/2024    CO2 31 08/27/2024    ANIONGAP 13 08/27/2024    BUN 17 08/27/2024    CREATININE 0.59 08/27/2024    CALCIUM 9.5 08/27/2024    PHOS 2.7 09/15/2020    ALBUMIN 4.5 09/11/2023    ALBUMIN 4.3 02/26/2021        Lab Results   Component Value Date    BNP 19 02/16/2018    HGBA1C 5.2 02/16/2018       Last Cardiology Tests:  ECG independently reviewed from today: Sinus rhythm, rate 76, LVH    Cath 9/6/24:  1. No significant angiographic coronary disease in co-dominant circulation.   2. LVEDP 17mmHg, no aortic stenosis on LV-Ao gradient.        Stress test 3/19/24:   1. ECG changes consistent with ischemia.   2. Submaximal level of stress achieved.   3. No Ectopy noted.     Echo 3/19/24:   1. Left ventricular systolic function is low normal with a 55% estimated ejection fraction.   2. Spectral Doppler shows an impaired relaxation pattern of left ventricular diastolic filling.   3. There is mild mitral, tricuspid and pulmonic regurgitation.     ZIO March 2023:  Patient had a  min HR of 58 bpm, max HR of 124 bpm, and avg HR of 79  bpm. Predominant underlying rhythm was Sinus Rhythm. Bundle Branch  Block/IVCD was present. 1 run of Ventricular Tachycardia occurred lasting  5 beats with a max rate of 124 bpm (avg 103 bpm). Isolated SVEs were  occasional (1.2%, 8239), SVE Couplets were rare (<1.0%, 21), and no SVE  Triplets were present. Isolated VEs were rare (<1.0%), and no VE Couplets  or VE Triplets were present.     Stress nuclear 11/2020:  1. Normal myocardial perfusion study without evidence of ischemia or  prior infarction.  2. The left ventricle is normal in size.  3. Normal LV wall motion with an LV EF estimated at greater than 55%.    Assessment/Plan   Very pleasant 69 year-old female with HTN, dyslipidemia and remote history of nonischemic cardiomyopathy due to chemotherapy, ejection fraction has since normalized with medical therapy, diastolic dysfunction.    Overall doing fairly well from a cardiac standpoint, currently NYHA II symptoms.  Given corresponding elevated blood pressure, I am going to start spironolactone 25 mg daily.  Plan to continue current losartan/HCTZ, Toprol-XL, and atorvastatin.  Will see her in 6 months or sooner if needed.              Desmond Saeed MD

## 2024-11-27 ENCOUNTER — OFFICE VISIT (OUTPATIENT)
Dept: ORTHOPEDIC SURGERY | Facility: CLINIC | Age: 69
End: 2024-11-27
Payer: MEDICARE

## 2024-11-27 ENCOUNTER — HOSPITAL ENCOUNTER (OUTPATIENT)
Dept: RADIOLOGY | Facility: CLINIC | Age: 69
Discharge: HOME | End: 2024-11-27
Payer: MEDICARE

## 2024-11-27 ENCOUNTER — LAB (OUTPATIENT)
Dept: LAB | Facility: LAB | Age: 69
End: 2024-11-27
Payer: MEDICARE

## 2024-11-27 VITALS — HEIGHT: 64 IN | BODY MASS INDEX: 25.61 KG/M2 | WEIGHT: 150 LBS

## 2024-11-27 DIAGNOSIS — S72.002S HIP FRACTURE, LEFT, SEQUELA: ICD-10-CM

## 2024-11-27 DIAGNOSIS — S72.002S HIP FRACTURE, LEFT, SEQUELA: Primary | ICD-10-CM

## 2024-11-27 DIAGNOSIS — Z96.642 HISTORY OF LEFT HIP REPLACEMENT: ICD-10-CM

## 2024-11-27 LAB
BASOPHILS # BLD AUTO: 0.06 X10*3/UL (ref 0–0.1)
BASOPHILS NFR BLD AUTO: 0.7 %
CRP SERPL-MCNC: 0.18 MG/DL
EOSINOPHIL # BLD AUTO: 0.2 X10*3/UL (ref 0–0.7)
EOSINOPHIL NFR BLD AUTO: 2.3 %
ERYTHROCYTE [DISTWIDTH] IN BLOOD BY AUTOMATED COUNT: 15.8 % (ref 11.5–14.5)
ERYTHROCYTE [SEDIMENTATION RATE] IN BLOOD BY WESTERGREN METHOD: 9 MM/H (ref 0–30)
HCT VFR BLD AUTO: 39.6 % (ref 36–46)
HGB BLD-MCNC: 13.4 G/DL (ref 12–16)
IMM GRANULOCYTES # BLD AUTO: 0.11 X10*3/UL (ref 0–0.7)
IMM GRANULOCYTES NFR BLD AUTO: 1.3 % (ref 0–0.9)
LYMPHOCYTES # BLD AUTO: 2.83 X10*3/UL (ref 1.2–4.8)
LYMPHOCYTES NFR BLD AUTO: 32.5 %
MCH RBC QN AUTO: 30.5 PG (ref 26–34)
MCHC RBC AUTO-ENTMCNC: 33.8 G/DL (ref 32–36)
MCV RBC AUTO: 90 FL (ref 80–100)
MONOCYTES # BLD AUTO: 0.85 X10*3/UL (ref 0.1–1)
MONOCYTES NFR BLD AUTO: 9.8 %
NEUTROPHILS # BLD AUTO: 4.65 X10*3/UL (ref 1.2–7.7)
NEUTROPHILS NFR BLD AUTO: 53.4 %
NRBC BLD-RTO: 0 /100 WBCS (ref 0–0)
PLATELET # BLD AUTO: 223 X10*3/UL (ref 150–450)
RBC # BLD AUTO: 4.39 X10*6/UL (ref 4–5.2)
WBC # BLD AUTO: 8.7 X10*3/UL (ref 4.4–11.3)

## 2024-11-27 PROCEDURE — 73502 X-RAY EXAM HIP UNI 2-3 VIEWS: CPT | Mod: LT

## 2024-11-27 PROCEDURE — 73502 X-RAY EXAM HIP UNI 2-3 VIEWS: CPT | Mod: LEFT SIDE | Performed by: STUDENT IN AN ORGANIZED HEALTH CARE EDUCATION/TRAINING PROGRAM

## 2024-11-27 PROCEDURE — 99214 OFFICE O/P EST MOD 30 MIN: CPT | Performed by: ORTHOPAEDIC SURGERY

## 2024-11-27 ASSESSMENT — PAIN - FUNCTIONAL ASSESSMENT: PAIN_FUNCTIONAL_ASSESSMENT: 0-10

## 2024-11-27 ASSESSMENT — PAIN DESCRIPTION - DESCRIPTORS: DESCRIPTORS: ACHING;SORE

## 2024-11-27 ASSESSMENT — PAIN SCALES - GENERAL: PAINLEVEL_OUTOF10: 4

## 2024-11-30 RX ORDER — TRIAMCINOLONE ACETONIDE 40 MG/ML
2.5 INJECTION, SUSPENSION INTRA-ARTICULAR; INTRAMUSCULAR
Status: COMPLETED | OUTPATIENT
Start: 2024-11-30 | End: 2024-11-30

## 2024-11-30 NOTE — PROGRESS NOTES
This is a patient well-known to me but I have not seen in several years.  She is 69 years old currently and has a history of a left hip replacement.  She is presenting today with left lateral hip pain.  It has been insidious in onset and is mostly lateral but also has some anterior portion.  It seems to be slightly worse with rising from seated position but not always.  Does not seem to be related to weightbearing.  Occasionally it can be thigh pain.  She presents with her family.  She is an avid caretaker and rider of horses and it is affecting her ability to do so.    The patients full medical history, surgical history, medications, allergies, family, medical history, social history, and a complete 30 point review of systems is documented in the medical record on the signed, scanned medical intake sheet or reviewed in the history of present illness.    Gen: The patient is alert and oriented ×3, is in no acute distress, and appear their stated age and weight.    Psychiatric: Mood and affect are appropriate.    Eyes: Sclera are white, and pupils are round and symmetric.    ENT: Mucous membranes are moist.     Neck: Supple. Thyroid is midline.    Respiratory: Respirations are nonlabored, chest rise is symmetric.    Cardiac: Rate is regular by palpation of distal pulses.     Abdomen: Nondistended.    Integument: No obvious cutaneous lesions are noted. No signs of lymphangitis. No signs of systemic edema.    Musculoskeletal examination left hip demonstrates pain to palpation over the left greater trochanter.  No pain with rotational movements of the hip.  Sensation is intact to light touch in the tibial, sural, saphenous, superficial peroneal, and deep peroneal nerve distributions. Foot is warm and well-perfused.    Multiple views of her left hip and pelvis demonstrate a well-fixed well aligned total hip arthroplasty without signs of eccentric wear.    69-year-old female many years out from a left hip total hip  replacement.  She has been able to ride horses up until just recently.  She is tender along the iliotibial band and greater trochanter.  Would like to check inflammatory markers just to rule out any infection or need for aspiration of her arthroplasty.  Otherwise, she seems to be presenting with trochanteric bursitis.  We discussed treatment options and will go with physical therapy and oral anti-inflammatories as well as an injection in the office today.    L Inj/Asp on 11/30/2024 3:56 PM  Indications: pain  Details: 22 G needle, lateral approach  Medications: 2.5 mg triamcinolone acetonide 40 mg/mL  Consent was given by the patient. Patient was prepped and draped in the usual sterile fashion.       Let her know if her inflammatory markers are elevated and if we have to proceed with aspiration but I am having a low suspicion of that today.  She can follow-up with me for additional injection or repeat evaluation in 6 to 8 weeks after therapy to reassess her lateral hip pain.    Natural History reviewed. All questions answered. The patient was in agreement with the plan.      **This note was created using voice recognition software and was not corrected for typographical or grammatical errors.**

## 2024-12-09 ENCOUNTER — APPOINTMENT (OUTPATIENT)
Dept: PRIMARY CARE | Facility: CLINIC | Age: 69
End: 2024-12-09
Payer: MEDICARE

## 2024-12-09 VITALS
OXYGEN SATURATION: 95 % | SYSTOLIC BLOOD PRESSURE: 136 MMHG | HEIGHT: 64 IN | BODY MASS INDEX: 25.61 KG/M2 | WEIGHT: 150 LBS | DIASTOLIC BLOOD PRESSURE: 77 MMHG | HEART RATE: 74 BPM

## 2024-12-09 DIAGNOSIS — I10 BENIGN ESSENTIAL HYPERTENSION: Primary | ICD-10-CM

## 2024-12-09 DIAGNOSIS — E78.2 MIXED HYPERLIPIDEMIA: ICD-10-CM

## 2024-12-09 DIAGNOSIS — M81.0 AGE-RELATED OSTEOPOROSIS WITHOUT CURRENT PATHOLOGICAL FRACTURE: ICD-10-CM

## 2024-12-09 DIAGNOSIS — Z12.11 COLON CANCER SCREENING: ICD-10-CM

## 2024-12-09 DIAGNOSIS — E03.9 HYPOTHYROIDISM, UNSPECIFIED TYPE: ICD-10-CM

## 2024-12-09 PROCEDURE — 99214 OFFICE O/P EST MOD 30 MIN: CPT | Performed by: NURSE PRACTITIONER

## 2024-12-09 PROCEDURE — 3075F SYST BP GE 130 - 139MM HG: CPT | Performed by: NURSE PRACTITIONER

## 2024-12-09 PROCEDURE — 1158F ADVNC CARE PLAN TLK DOCD: CPT | Performed by: NURSE PRACTITIONER

## 2024-12-09 PROCEDURE — 3008F BODY MASS INDEX DOCD: CPT | Performed by: NURSE PRACTITIONER

## 2024-12-09 PROCEDURE — 1123F ACP DISCUSS/DSCN MKR DOCD: CPT | Performed by: NURSE PRACTITIONER

## 2024-12-09 PROCEDURE — 1160F RVW MEDS BY RX/DR IN RCRD: CPT | Performed by: NURSE PRACTITIONER

## 2024-12-09 PROCEDURE — 1159F MED LIST DOCD IN RCRD: CPT | Performed by: NURSE PRACTITIONER

## 2024-12-09 PROCEDURE — 1170F FXNL STATUS ASSESSED: CPT | Performed by: NURSE PRACTITIONER

## 2024-12-09 PROCEDURE — 3078F DIAST BP <80 MM HG: CPT | Performed by: NURSE PRACTITIONER

## 2024-12-09 PROCEDURE — 1157F ADVNC CARE PLAN IN RCRD: CPT | Performed by: NURSE PRACTITIONER

## 2024-12-09 RX ORDER — ALENDRONATE SODIUM 70 MG/1
70 TABLET ORAL
Qty: 4 TABLET | Refills: 11 | Status: SHIPPED | OUTPATIENT
Start: 2024-12-09 | End: 2025-12-09

## 2024-12-09 RX ORDER — LEVOTHYROXINE SODIUM 137 UG/1
137 TABLET ORAL
Qty: 90 TABLET | Refills: 3 | Status: SHIPPED | OUTPATIENT
Start: 2024-12-09

## 2024-12-09 ASSESSMENT — ACTIVITIES OF DAILY LIVING (ADL)
DOING_HOUSEWORK: INDEPENDENT
GROCERY_SHOPPING: INDEPENDENT
BATHING: INDEPENDENT
TAKING_MEDICATION: INDEPENDENT
MANAGING_FINANCES: INDEPENDENT
DRESSING: INDEPENDENT

## 2024-12-09 ASSESSMENT — ENCOUNTER SYMPTOMS
DEPRESSION: 0
LOSS OF SENSATION IN FEET: 0
OCCASIONAL FEELINGS OF UNSTEADINESS: 1

## 2024-12-09 NOTE — PATIENT INSTRUCTIONS
Famotidine 20 mg in am and pm for post nasal drainage.   Flonase 1 spray each nostril and and pm x 10 days DO NOT SNIFF  Local Honey  Salt water gargle

## 2024-12-09 NOTE — PROGRESS NOTES
"Subjective   Patient ID: Cady Haynes is a 69 y.o. female who presents for Medicare Annual Wellness Visit Subsequent (Patient is here today for a medicare wellness visit. Patient would like to discuss her constant urination. ).    69 year old female here for routine follow up  Saw cardio who started her on aldactone- this has helped the edema in LE and SOB. Lost approx 11 lb in the past 1 year  Chronic arthritis- Dr Villegas for shoulder injection  OP- not on meds  Had a virus Nov- sinus infection- mucinex and netti-pot. No atbx. Still with thick clear PND.   Hypothyroid- on T4 daily  Muscle cramping    Prevention:  Breast Ca screen: mamm 9/2024. Mom breast ca- Paget disease  Colon Ca Screen: 8/2019. Due now. Fam HX- father  Lung Ca Screen: non smoker. No fam HX  DEXA: RT hip OP. She had been on fosamax in the past. Not sure why it was stopped. She did not have any SE while on it. On a calcium  supp + Vit D  CT Cardiac Score: cardio follows. Cath 9/2024. ECHO 3/2024. EKG UTD.   Diabetes Screen: brother DM2  Opthal: last vision check within the year/ trifocals.   Dental: UTD  Diet: caffeine- 1 C a day; 3 meals a day  Exercise: started with PT for LT hip pain at [employer]; take care of horses            Review of Systems    Objective   /77   Pulse 74   Ht 1.626 m (5' 4\")   Wt 68 kg (150 lb)   SpO2 95%   BMI 25.75 kg/m²     Physical Exam  Vitals reviewed.   Constitutional:       General: She is not in acute distress.     Appearance: Normal appearance.   HENT:      Head: Normocephalic and atraumatic.      Mouth/Throat:      Mouth: Mucous membranes are moist.      Pharynx: Oropharyngeal exudate present.   Eyes:      Extraocular Movements: Extraocular movements intact.      Conjunctiva/sclera: Conjunctivae normal.      Pupils: Pupils are equal, round, and reactive to light.   Neck:      Vascular: No carotid bruit.   Cardiovascular:      Rate and Rhythm: Normal rate and regular rhythm.      Pulses: Normal pulses. "      Heart sounds: Normal heart sounds. No murmur heard.  Pulmonary:      Effort: Pulmonary effort is normal.      Breath sounds: Normal breath sounds.   Abdominal:      General: Bowel sounds are normal. There is no distension.      Palpations: Abdomen is soft.      Tenderness: There is no abdominal tenderness.   Musculoskeletal:         General: Normal range of motion.      Cervical back: Normal range of motion and neck supple.   Lymphadenopathy:      Cervical: No cervical adenopathy.   Skin:     General: Skin is warm and dry.   Neurological:      General: No focal deficit present.      Mental Status: She is alert and oriented to person, place, and time. Mental status is at baseline.   Psychiatric:         Mood and Affect: Mood normal.         Behavior: Behavior normal.         Assessment/Plan   Diagnoses and all orders for this visit:  Benign essential hypertension  -     Magnesium; Future  -     Comprehensive Metabolic Panel; Future  Hypothyroidism, unspecified type  -     levothyroxine (Synthroid, Levoxyl) 137 mcg tablet; Take 1 tablet (137 mcg) by mouth once daily in the morning. Take before meals.  -     TSH with reflex to Free T4 if abnormal; Future  Mixed hyperlipidemia  -     Lipid Panel; Future  Colon cancer screening  -     Colonoscopy Screening; Average Risk Patient; Future  Age-related osteoporosis without current pathological fracture  -     Magnesium; Future  -     alendronate (Fosamax) 70 mg tablet; Take 1 tablet (70 mg) by mouth every 7 days. Take in the morning with a full glass of water, on an empty stomach, and do not take anything else by mouth or lie down for the next 30 min.  -     Vitamin D 25-Hydroxy,Total (for eval of Vitamin D levels); Future  Other orders  -     Follow Up In Primary Care - Established  -     Follow Up In Primary Care - Medicare Annual; Future

## 2024-12-12 ENCOUNTER — LAB (OUTPATIENT)
Dept: LAB | Facility: LAB | Age: 69
End: 2024-12-12
Payer: MEDICARE

## 2024-12-12 DIAGNOSIS — E03.9 HYPOTHYROIDISM, UNSPECIFIED TYPE: ICD-10-CM

## 2024-12-12 DIAGNOSIS — I10 BENIGN ESSENTIAL HYPERTENSION: ICD-10-CM

## 2024-12-12 DIAGNOSIS — E78.2 MIXED HYPERLIPIDEMIA: ICD-10-CM

## 2024-12-12 DIAGNOSIS — M81.0 AGE-RELATED OSTEOPOROSIS WITHOUT CURRENT PATHOLOGICAL FRACTURE: ICD-10-CM

## 2024-12-12 LAB
ALBUMIN SERPL BCP-MCNC: 4.3 G/DL (ref 3.4–5)
ALP SERPL-CCNC: 87 U/L (ref 33–136)
ALT SERPL W P-5'-P-CCNC: 26 U/L (ref 7–45)
ANION GAP SERPL CALC-SCNC: 11 MMOL/L (ref 10–20)
AST SERPL W P-5'-P-CCNC: 22 U/L (ref 9–39)
BILIRUB SERPL-MCNC: 1.7 MG/DL (ref 0–1.2)
BUN SERPL-MCNC: 19 MG/DL (ref 6–23)
CALCIUM SERPL-MCNC: 10.1 MG/DL (ref 8.6–10.3)
CHLORIDE SERPL-SCNC: 100 MMOL/L (ref 98–107)
CHOLEST SERPL-MCNC: 147 MG/DL (ref 0–199)
CHOLESTEROL/HDL RATIO: 3.8
CO2 SERPL-SCNC: 31 MMOL/L (ref 21–32)
CREAT SERPL-MCNC: 0.73 MG/DL (ref 0.5–1.05)
EGFRCR SERPLBLD CKD-EPI 2021: 89 ML/MIN/1.73M*2
GLUCOSE SERPL-MCNC: 86 MG/DL (ref 74–99)
HDLC SERPL-MCNC: 39.2 MG/DL
LDLC SERPL CALC-MCNC: 71 MG/DL
MAGNESIUM SERPL-MCNC: 1.89 MG/DL (ref 1.6–2.4)
NON HDL CHOLESTEROL: 108 MG/DL (ref 0–149)
POTASSIUM SERPL-SCNC: 4.3 MMOL/L (ref 3.5–5.3)
PROT SERPL-MCNC: 6.1 G/DL (ref 6.4–8.2)
SODIUM SERPL-SCNC: 138 MMOL/L (ref 136–145)
TRIGL SERPL-MCNC: 183 MG/DL (ref 0–149)
TSH SERPL-ACNC: 0.69 MIU/L (ref 0.44–3.98)
VLDL: 37 MG/DL (ref 0–40)

## 2024-12-12 PROCEDURE — 36415 COLL VENOUS BLD VENIPUNCTURE: CPT

## 2024-12-12 PROCEDURE — 80061 LIPID PANEL: CPT

## 2024-12-12 PROCEDURE — 80053 COMPREHEN METABOLIC PANEL: CPT

## 2024-12-12 PROCEDURE — 84443 ASSAY THYROID STIM HORMONE: CPT

## 2024-12-12 PROCEDURE — 82306 VITAMIN D 25 HYDROXY: CPT

## 2024-12-12 PROCEDURE — 83735 ASSAY OF MAGNESIUM: CPT

## 2024-12-13 LAB — 25(OH)D3 SERPL-MCNC: 49 NG/ML (ref 30–100)

## 2024-12-15 ENCOUNTER — PATIENT MESSAGE (OUTPATIENT)
Dept: PRIMARY CARE | Facility: CLINIC | Age: 69
End: 2024-12-15
Payer: MEDICARE

## 2024-12-15 DIAGNOSIS — J01.40 SUBACUTE PANSINUSITIS: Primary | ICD-10-CM

## 2024-12-19 RX ORDER — AMOXICILLIN AND CLAVULANATE POTASSIUM 500; 125 MG/1; MG/1
500 TABLET, FILM COATED ORAL 2 TIMES DAILY
Qty: 20 TABLET | Refills: 0 | Status: SHIPPED | OUTPATIENT
Start: 2024-12-19 | End: 2024-12-29

## 2024-12-24 ENCOUNTER — TELEPHONE (OUTPATIENT)
Dept: PRIMARY CARE | Facility: CLINIC | Age: 69
End: 2024-12-24
Payer: MEDICARE

## 2024-12-24 DIAGNOSIS — M81.0 AGE-RELATED OSTEOPOROSIS WITHOUT CURRENT PATHOLOGICAL FRACTURE: ICD-10-CM

## 2024-12-24 RX ORDER — ALENDRONATE SODIUM 70 MG/1
70 TABLET ORAL
Qty: 12 TABLET | Refills: 3 | Status: SHIPPED | OUTPATIENT
Start: 2024-12-24 | End: 2025-12-24

## 2025-04-21 DIAGNOSIS — I10 BENIGN ESSENTIAL HYPERTENSION: ICD-10-CM

## 2025-04-21 RX ORDER — LOSARTAN POTASSIUM AND HYDROCHLOROTHIAZIDE 25; 100 MG/1; MG/1
1 TABLET ORAL DAILY
Qty: 90 TABLET | Refills: 3 | Status: SHIPPED | OUTPATIENT
Start: 2025-04-21

## 2025-05-15 ENCOUNTER — OFFICE VISIT (OUTPATIENT)
Dept: CARDIOLOGY | Facility: HOSPITAL | Age: 70
End: 2025-05-15
Payer: MEDICARE

## 2025-05-15 VITALS
DIASTOLIC BLOOD PRESSURE: 73 MMHG | OXYGEN SATURATION: 97 % | SYSTOLIC BLOOD PRESSURE: 140 MMHG | BODY MASS INDEX: 26.11 KG/M2 | WEIGHT: 152.12 LBS | HEART RATE: 68 BPM

## 2025-05-15 DIAGNOSIS — I42.8 NONISCHEMIC CARDIOMYOPATHY (MULTI): Primary | ICD-10-CM

## 2025-05-15 DIAGNOSIS — I10 BENIGN ESSENTIAL HYPERTENSION: ICD-10-CM

## 2025-05-15 LAB
ATRIAL RATE: 65 BPM
P AXIS: 41 DEGREES
P OFFSET: 201 MS
P ONSET: 153 MS
PR INTERVAL: 130 MS
Q ONSET: 218 MS
QRS COUNT: 11 BEATS
QRS DURATION: 124 MS
QT INTERVAL: 388 MS
QTC CALCULATION(BAZETT): 403 MS
QTC FREDERICIA: 398 MS
R AXIS: -31 DEGREES
T AXIS: 43 DEGREES
T OFFSET: 412 MS
VENTRICULAR RATE: 65 BPM

## 2025-05-15 PROCEDURE — G2211 COMPLEX E/M VISIT ADD ON: HCPCS | Performed by: INTERNAL MEDICINE

## 2025-05-15 PROCEDURE — 3077F SYST BP >= 140 MM HG: CPT | Performed by: INTERNAL MEDICINE

## 2025-05-15 PROCEDURE — 99213 OFFICE O/P EST LOW 20 MIN: CPT | Performed by: INTERNAL MEDICINE

## 2025-05-15 PROCEDURE — 3078F DIAST BP <80 MM HG: CPT | Performed by: INTERNAL MEDICINE

## 2025-05-15 PROCEDURE — 93005 ELECTROCARDIOGRAM TRACING: CPT | Performed by: INTERNAL MEDICINE

## 2025-05-15 PROCEDURE — 1157F ADVNC CARE PLAN IN RCRD: CPT | Performed by: INTERNAL MEDICINE

## 2025-05-15 PROCEDURE — 1123F ACP DISCUSS/DSCN MKR DOCD: CPT | Performed by: INTERNAL MEDICINE

## 2025-05-15 PROCEDURE — 1159F MED LIST DOCD IN RCRD: CPT | Performed by: INTERNAL MEDICINE

## 2025-05-15 PROCEDURE — 1036F TOBACCO NON-USER: CPT | Performed by: INTERNAL MEDICINE

## 2025-05-15 NOTE — PROGRESS NOTES
Chief Complaint:   No chief complaint on file.     History Of Present Illness:    Cady Haynes is a 70 y.o. female presenting for follow-up.  Doing well from a cardiac standpoint.  Endorses some general malaise at times, however denies any angina, significant dyspnea on exertion, palpitations, lightheadedness.  Getting ready to get her horses out for the season.     Last Recorded Vitals:  Vitals:    05/15/25 0936 05/15/25 1014   BP: 166/74 140/73   Pulse: 68    SpO2: 97%    Weight: 69 kg (152 lb 1.9 oz)        Past Medical History:  She has a past medical history of Acetabular fracture (Multi) (09/13/2023), Acute upper respiratory infection, unspecified (12/10/2014), Candidiasis of skin and nail (06/10/2021), Cardiomyopathy (09/13/2023), Cardiomyopathy, unspecified, Cellulitis of unspecified part of limb (06/16/2014), Cellulitis, unspecified, Contusion (09/13/2023), Contusion of unspecified front wall of thorax, initial encounter (06/16/2014), Contusion of unspecified lower leg, initial encounter (10/13/2021), Effusion, unspecified ankle (10/15/2019), Encounter for general adult medical examination without abnormal findings (12/09/2015), Encounter for immunization (12/09/2015), Encounter for other preprocedural examination (12/07/2020), Encounter for other specified surgical aftercare (12/06/2017), Encounter for preprocedural cardiovascular examination (11/30/2020), Encounter for preprocedural laboratory examination (11/22/2021), Enlarged lymph nodes, unspecified (04/02/2019), Fall from horse (09/13/2023), Fracture of thoracic spine (Multi) (09/13/2023), Hamstring injury (09/13/2023), Hypercalcemia (09/13/2023), Hyperlipidemia, Hypertension, Impaired fasting glucose (02/08/2018), Infection and inflammatory reaction due to other internal prosthetic devices, implants and grafts, initial encounter (06/17/2018), Injury of left knee (09/13/2023), Insect bite (nonvenomous), left ankle, initial encounter (CODE)  (08/31/2020), Left acoustic neuroma (Multi) (09/13/2023), Menopausal symptom (09/13/2023), Metabolic syndrome (02/25/2015), NICM (nonischemic cardiomyopathy) (Multi) (09/13/2023), Non-Hodgkin lymphoma, unspecified, unspecified site, Non-Hodgkin's lymphoma (09/13/2023), Osteopenia (09/13/2023), Other conditions influencing health status, Other conditions influencing health status (11/06/2012), Other reduced mobility (03/20/2020), Pain in left hip (12/15/2020), Pain in left knee (12/23/2019), Paroxysmal supraventricular tachycardia (CMS-HCC) (09/13/2023), Personal history of diseases of the skin and subcutaneous tissue, Personal history of other diseases of the circulatory system, Personal history of other diseases of the musculoskeletal system and connective tissue (10/27/2017), Personal history of other diseases of the respiratory system, Personal history of other drug therapy (11/06/2012), Personal history of other endocrine, nutritional and metabolic disease (09/12/2018), Personal history of other mental and behavioral disorders (12/04/2013), Personal history of other specified conditions (09/26/2014), Personal history of other specified conditions (08/27/2013), Personal history of other specified conditions (10/27/2017), 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    Past Surgical History:  She has a past surgical history that includes Tonsillectomy (11/06/2012); Other surgical history (11/06/2012); Cardiac catheterization (N/A, 09/06/2024); Lymph node biopsy (1998); Other surgical history (1999); Excision basal cell carcinoma (2001); Hip fracture surgery (Left, 09/2019); and Revision total hip arthroplasty (Left, 12/2020).      Social  History:  She reports that she has never smoked. She has never used smokeless tobacco. She reports current alcohol use. She reports that she does not use drugs.    Family History:  Family History[1]     Allergies:  Patient has no known allergies.    Outpatient Medications:  Current Outpatient Medications   Medication Instructions    alendronate (FOSAMAX) 70 mg, oral, Every 7 days, Take in the morning with a full glass of water, on an empty stomach, and do not take anything else by mouth or lie down for the next 30 min.    alpha tocopherol (Vitamin E) 670 mg (1,000 unit) capsule 1 capsule, Daily    atorvastatin (LIPITOR) 20 mg, oral, Every 24 hours    CALCIUM CITRATE-VITAMIN D2 ORAL 1 tablet, Daily    levothyroxine (SYNTHROID, LEVOXYL) 137 mcg, oral, Daily before breakfast    losartan-hydrochlorothiazide (Hyzaar) 100-25 mg tablet 1 tablet, oral, Daily    metoprolol succinate XL (TOPROL-XL) 100 mg, oral, Daily, Do not crush or chew.    multivitamin tablet 1 tablet, Daily    spironolactone (ALDACTONE) 25 mg, oral, Daily       Physical Exam:  Constitutional:       Appearance: Healthy appearance. Not in distress.   Neck:      Vascular: No JVR. JVD normal.   Pulmonary:      Effort: Pulmonary effort is normal.      Breath sounds: Normal breath sounds. No wheezing. No rhonchi. No rales.   Chest:      Chest wall: Not tender to palpatation.   Cardiovascular:      Normal rate. Regular rhythm.      Murmurs: There is no murmur.   Edema:     Peripheral edema absent.   Abdominal:      General: Bowel sounds are normal.      Palpations: Abdomen is soft.      Tenderness: There is no abdominal tenderness.   Musculoskeletal: Normal range of motion. Skin:     General: Skin is warm and dry.   Neurological:      General: No focal deficit present.      Mental Status: Alert and oriented to person, place and time.           Last Labs:  CBC -  Lab Results   Component Value Date    WBC 8.7 11/27/2024    HGB 13.4 11/27/2024    HCT 39.6  11/27/2024    MCV 90 11/27/2024     11/27/2024       CMP -  Lab Results   Component Value Date    CALCIUM 10.1 12/12/2024    PHOS 2.7 09/15/2020    PROT 6.1 (L) 12/12/2024    ALBUMIN 4.3 12/12/2024    AST 22 12/12/2024    ALT 26 12/12/2024    ALKPHOS 87 12/12/2024    BILITOT 1.7 (H) 12/12/2024       LIPID PANEL -   Lab Results   Component Value Date    CHOL 147 12/12/2024    TRIG 183 (H) 12/12/2024    HDL 39.2 12/12/2024    CHHDL 3.8 12/12/2024    VLDL 37 12/12/2024    NHDL 108 12/12/2024    LDLCALC 71 12/12/2024       RENAL FUNCTION PANEL -   Lab Results   Component Value Date    GLUCOSE 86 12/12/2024     12/12/2024    K 4.3 12/12/2024     12/12/2024    CO2 31 12/12/2024    ANIONGAP 11 12/12/2024    BUN 19 12/12/2024    CREATININE 0.73 12/12/2024    CALCIUM 10.1 12/12/2024    PHOS 2.7 09/15/2020    ALBUMIN 4.3 12/12/2024        Lab Results   Component Value Date    BNP 19 02/16/2018    HGBA1C 5.2 02/16/2018       Last Cardiology Tests:  ECG independently reviewed from today: Sinus rhythm, rate 65, LVH     Cath 9/6/24:  1. No significant angiographic coronary disease in co-dominant circulation.   2. LVEDP 17mmHg, no aortic stenosis on LV-Ao gradient.        Stress test 3/19/24:   1. ECG changes consistent with ischemia.   2. Submaximal level of stress achieved.   3. No Ectopy noted.     Echo 3/19/24:   1. Left ventricular systolic function is low normal with a 55% estimated ejection fraction.   2. Spectral Doppler shows an impaired relaxation pattern of left ventricular diastolic filling.   3. There is mild mitral, tricuspid and pulmonic regurgitation.     ZIO March 2023:  Patient had a min HR of 58 bpm, max HR of 124 bpm, and avg HR of 79  bpm. Predominant underlying rhythm was Sinus Rhythm. Bundle Branch  Block/IVCD was present. 1 run of Ventricular Tachycardia occurred lasting  5 beats with a max rate of 124 bpm (avg 103 bpm). Isolated SVEs were  occasional (1.2%, 8239), SVE Couplets were rare  (<1.0%, 21), and no SVE  Triplets were present. Isolated VEs were rare (<1.0%), and no VE Couplets  or VE Triplets were present.     Stress nuclear 11/2020:  1. Normal myocardial perfusion study without evidence of ischemia or  prior infarction.  2. The left ventricle is normal in size.  3. Normal LV wall motion with an LV EF estimated at greater than 55%.    Assessment/Plan   Very pleasant 70 year-old female with HTN, dyslipidemia and remote history of nonischemic cardiomyopathy due to chemotherapy, ejection fraction has since normalized with medical therapy, diastolic dysfunction.     Overall doing fairly well from a cardiac standpoint.  Plan to continue current atorvastatin, losartan-HCTZ, Toprol-XL, and spironolactone.  We will see her again in 1 year or sooner if needed.    Desmond Saeed MD         [1]   Family History  Problem Relation Name Age of Onset    Kidney failure Mother      COPD Mother      Arthritis Mother      Breast cancer Mother      Heart disease Mother      COPD Father      Diabetes Father      Heart failure Sister

## 2025-05-20 ENCOUNTER — CLINICAL SUPPORT (OUTPATIENT)
Dept: AUDIOLOGY | Facility: CLINIC | Age: 70
End: 2025-05-20
Payer: MEDICARE

## 2025-05-20 DIAGNOSIS — H90.3 SNHL (SENSORY-NEURAL HEARING LOSS), ASYMMETRICAL: Primary | ICD-10-CM

## 2025-05-20 PROCEDURE — 92550 TYMPANOMETRY & REFLEX THRESH: CPT | Mod: 52 | Performed by: AUDIOLOGIST

## 2025-05-20 PROCEDURE — 92557 COMPREHENSIVE HEARING TEST: CPT | Performed by: AUDIOLOGIST

## 2025-05-20 PROCEDURE — V5011 HEARING AID FITTING/CHECKING: HCPCS | Mod: AUDSP | Performed by: AUDIOLOGIST

## 2025-05-20 ASSESSMENT — PAIN SCALES - GENERAL: PAINLEVEL_OUTOF10: 0 - NO PAIN

## 2025-05-20 ASSESSMENT — PAIN - FUNCTIONAL ASSESSMENT: PAIN_FUNCTIONAL_ASSESSMENT: 0-10

## 2025-05-20 NOTE — PROGRESS NOTES
HISTORY:  Hearing evaluation today.  Her previous hearing test was 9/29/2020  She has an asymmetrical hearing loss and wears a left BAHA 5 in January 2018.   She states that her last MRI was 2021 and her last visit with Dr. Horta was around the same time.    She states that she is having more difficulty hearing.    She is possibly interested in an upgrade.    No pain  No aural fullness  Some dizziness since she had the acoustic neuroma removal.    She recently fell about a week ago and tripped over an extension cord.  She did hit the left side of her head and knocked her BAHA off.  No injuries.  No follow up with her physician.      RESULTS:  Otoscopic inspection showed clear ear canals bilaterally.    Immittance testing showed a normal left tympanogram and some negative pressure in the right ear.    Ipsilateral acoustic reflexes were tested at 500-4000Hz in both ears.  They were absent at 500-4000Hz in both ears.    Pure tone air and bone conduction testing showed essentially normal hearing at 125-500Hz sloping to mild at 1000-4000Hz sloping to a profound sensorineural hearing loss at 8000Hz in the right ear and a profound sensorineural hearing loss at 125-8000Hz in the left ear.      Word discrimination scores were excellent in the right ear and could not be tested in the left ear as there was no response at the limits of the equipment.      IMPRESSIONS:  Her BAHA 5 was connected to [employer] today and feedback analyzer and [employer] was run.  She did not notice much difference in office but stated that she will see if this helps her more at home. I explained that if she wishes to upgrade to the BAHA 6 she will need to call [employer] to initiate this process and update her insurance information .  She will also need to follow up with Dr. Horta as she has not been seen since 2020.  If she does decide to pursue an upgrade she will need to choose an accessory and verify color choice.    We also discussed that she  is a candidate for a hearing aid in the right ear as well.      Treatment Plan:   Annual hearing evaluations.   BAHA checks as needed.    Consider upgrading to the BAHA 6.  You will need to call [employer] to initiate this process and update your insurance information .  You will also need to reestablish care with Dr. Horta.   Consider a right hearing aid.     TIME:    [id number]

## 2025-06-12 ENCOUNTER — APPOINTMENT (OUTPATIENT)
Dept: PRIMARY CARE | Facility: CLINIC | Age: 70
End: 2025-06-12
Payer: MEDICARE

## 2025-06-12 VITALS
DIASTOLIC BLOOD PRESSURE: 64 MMHG | WEIGHT: 151 LBS | OXYGEN SATURATION: 96 % | SYSTOLIC BLOOD PRESSURE: 115 MMHG | HEIGHT: 64 IN | HEART RATE: 70 BPM | BODY MASS INDEX: 25.78 KG/M2

## 2025-06-12 DIAGNOSIS — Z00.00 ROUTINE GENERAL MEDICAL EXAMINATION AT HEALTH CARE FACILITY: Primary | ICD-10-CM

## 2025-06-12 DIAGNOSIS — I10 BENIGN ESSENTIAL HYPERTENSION: ICD-10-CM

## 2025-06-12 DIAGNOSIS — E03.9 HYPOTHYROIDISM, UNSPECIFIED TYPE: ICD-10-CM

## 2025-06-12 DIAGNOSIS — I49.3 PVC (PREMATURE VENTRICULAR CONTRACTION): ICD-10-CM

## 2025-06-12 PROCEDURE — G0439 PPPS, SUBSEQ VISIT: HCPCS | Performed by: NURSE PRACTITIONER

## 2025-06-12 PROCEDURE — 99213 OFFICE O/P EST LOW 20 MIN: CPT | Performed by: NURSE PRACTITIONER

## 2025-06-12 PROCEDURE — 3008F BODY MASS INDEX DOCD: CPT | Performed by: NURSE PRACTITIONER

## 2025-06-12 PROCEDURE — 1036F TOBACCO NON-USER: CPT | Performed by: NURSE PRACTITIONER

## 2025-06-12 PROCEDURE — 1160F RVW MEDS BY RX/DR IN RCRD: CPT | Performed by: NURSE PRACTITIONER

## 2025-06-12 PROCEDURE — 1170F FXNL STATUS ASSESSED: CPT | Performed by: NURSE PRACTITIONER

## 2025-06-12 PROCEDURE — 3078F DIAST BP <80 MM HG: CPT | Performed by: NURSE PRACTITIONER

## 2025-06-12 PROCEDURE — 3074F SYST BP LT 130 MM HG: CPT | Performed by: NURSE PRACTITIONER

## 2025-06-12 PROCEDURE — 1159F MED LIST DOCD IN RCRD: CPT | Performed by: NURSE PRACTITIONER

## 2025-06-12 RX ORDER — METOPROLOL SUCCINATE 100 MG/1
100 TABLET, EXTENDED RELEASE ORAL DAILY
Qty: 90 TABLET | Refills: 3 | Status: SHIPPED | OUTPATIENT
Start: 2025-06-12 | End: 2026-06-12

## 2025-06-12 ASSESSMENT — ACTIVITIES OF DAILY LIVING (ADL)
GROCERY_SHOPPING: INDEPENDENT
MANAGING_FINANCES: INDEPENDENT
TAKING_MEDICATION: INDEPENDENT
DOING_HOUSEWORK: INDEPENDENT
DRESSING: INDEPENDENT
BATHING: INDEPENDENT

## 2025-06-12 ASSESSMENT — ENCOUNTER SYMPTOMS
OCCASIONAL FEELINGS OF UNSTEADINESS: 1
LOSS OF SENSATION IN FEET: 0
DEPRESSION: 0

## 2025-06-12 NOTE — PATIENT INSTRUCTIONS
Magnesium glycinate 100-200 mg 1-2 times a day. Helps with mood, sleep, stress  Ok to take 2 tabs levothyroxine on Saturday and skip the day of your fosamax  Get your labs non fasting

## 2025-06-12 NOTE — PROGRESS NOTES
"Subjective   Patient ID: Cady Haynes is a 70 y.o. female who presents for Medicare Annual Wellness Visit Subsequent.    70 year old female here for   She states she wakes feeling unrested but does not sleep through the night-   Follows with  cardio who started her on aldactone last year- this has helped the edema in LE and SOB.  Chronic arthritis- has not seen ortho lately. Chronic throbbing in the shoulder. Has been getting cortisone inj. The last one did not work.   OP- back on fosamax. No side effects  Hypothyroid- on T4 daily  Muscle cramping     Prevention:  Breast Ca screen: mamm 9/2024. Mom breast ca- Paget disease  Colon Ca Screen: 1/2023. Due now. Fam HX- father  Lung Ca Screen: non smoker. No fam HX  DEXA: RT hip OP.  On a calcium  supp + Vit D. DEXA 8/2024  CT Cardiac Score: cardio follows. Cath 9/2024. ECHO 3/2024. EKG UTD.   Diabetes Screen: brother DM2  Opthal: last vision check within the year/ trifocals.   Dental: UTD  Diet: caffeine- 1 C a day; 3 meals a day  Exercise: take care of horses              Review of Systems    Objective   /64   Pulse 70   Ht 1.626 m (5' 4\")   Wt 68.5 kg (151 lb)   SpO2 96%   BMI 25.92 kg/m²     Physical Exam  Vitals reviewed.   Constitutional:       General: She is not in acute distress.     Appearance: Normal appearance.   HENT:      Head: Normocephalic and atraumatic.   Eyes:      Extraocular Movements: Extraocular movements intact.      Conjunctiva/sclera: Conjunctivae normal.      Pupils: Pupils are equal, round, and reactive to light.   Neck:      Vascular: No carotid bruit.   Cardiovascular:      Rate and Rhythm: Normal rate and regular rhythm.      Pulses: Normal pulses.      Heart sounds: Murmur heard.   Pulmonary:      Effort: Pulmonary effort is normal.      Breath sounds: Normal breath sounds.   Abdominal:      General: Bowel sounds are normal. There is no distension.      Palpations: Abdomen is soft.      Tenderness: There is no abdominal " tenderness.   Musculoskeletal:         General: Normal range of motion.      Cervical back: Normal range of motion and neck supple.      Right lower leg: Edema present.      Left lower leg: Edema present.   Skin:     General: Skin is warm and dry.   Neurological:      General: No focal deficit present.      Mental Status: She is alert and oriented to person, place, and time. Mental status is at baseline.   Psychiatric:         Mood and Affect: Mood normal.         Behavior: Behavior normal.         Assessment/Plan   Diagnoses and all orders for this visit:  Routine general medical examination at health care facility  Comments:  all screening UTD  Orders:  -     1 Year Follow Up In Primary Care - Wellness Exam; Future  Hypothyroidism, unspecified type  Comments:  update lab. OK to take the T4 1. tab 5 days and 2 tab on 1 day. skip the tab on Sunday(fosamax day) as she will take 2 on Saturday  Orders:  -     TSH with reflex to Free T4 if abnormal; Future  Benign essential hypertension  Comments:  well controlled  Orders:  -     Basic Metabolic Panel; Future  PVC (premature ventricular contraction)  Comments:  Needs a refill on her beta-blocker  Orders:  -     metoprolol succinate XL (Toprol-XL) 100 mg 24 hr tablet; Take 1 tablet (100 mg) by mouth once daily. Do not crush or chew.  Other orders  -     Follow Up In Primary Care - Medicare Annual

## 2025-06-13 LAB
ANION GAP SERPL CALCULATED.4IONS-SCNC: 13 MMOL/L (CALC) (ref 7–17)
BUN SERPL-MCNC: 17 MG/DL (ref 7–25)
BUN/CREAT SERPL: NORMAL (CALC) (ref 6–22)
CALCIUM SERPL-MCNC: 10.1 MG/DL (ref 8.6–10.4)
CHLORIDE SERPL-SCNC: 103 MMOL/L (ref 98–110)
CO2 SERPL-SCNC: 26 MMOL/L (ref 20–32)
CREAT SERPL-MCNC: 0.7 MG/DL (ref 0.6–1)
EGFRCR SERPLBLD CKD-EPI 2021: 92 ML/MIN/1.73M2
GLUCOSE SERPL-MCNC: 96 MG/DL (ref 65–99)
POTASSIUM SERPL-SCNC: 3.9 MMOL/L (ref 3.5–5.3)
SODIUM SERPL-SCNC: 142 MMOL/L (ref 135–146)
TSH SERPL-ACNC: 0.76 MIU/L (ref 0.4–4.5)

## 2025-08-02 ENCOUNTER — HOSPITAL ENCOUNTER (EMERGENCY)
Facility: HOSPITAL | Age: 70
Discharge: HOME | End: 2025-08-02
Payer: MEDICARE

## 2025-08-02 ENCOUNTER — OFFICE VISIT (OUTPATIENT)
Dept: URGENT CARE | Age: 70
End: 2025-08-02
Payer: MEDICARE

## 2025-08-02 ENCOUNTER — APPOINTMENT (OUTPATIENT)
Dept: RADIOLOGY | Facility: HOSPITAL | Age: 70
End: 2025-08-02
Payer: MEDICARE

## 2025-08-02 VITALS
DIASTOLIC BLOOD PRESSURE: 76 MMHG | WEIGHT: 154.76 LBS | BODY MASS INDEX: 26.42 KG/M2 | SYSTOLIC BLOOD PRESSURE: 152 MMHG | TEMPERATURE: 98.4 F | HEART RATE: 75 BPM | OXYGEN SATURATION: 98 % | HEIGHT: 64 IN | RESPIRATION RATE: 18 BRPM

## 2025-08-02 VITALS
RESPIRATION RATE: 19 BRPM | TEMPERATURE: 98.2 F | WEIGHT: 153 LBS | DIASTOLIC BLOOD PRESSURE: 76 MMHG | BODY MASS INDEX: 26.26 KG/M2 | OXYGEN SATURATION: 97 % | SYSTOLIC BLOOD PRESSURE: 148 MMHG | HEART RATE: 77 BPM

## 2025-08-02 DIAGNOSIS — S22.42XA CLOSED FRACTURE OF MULTIPLE RIBS OF LEFT SIDE, INITIAL ENCOUNTER: Primary | ICD-10-CM

## 2025-08-02 DIAGNOSIS — R42 DIZZINESS: ICD-10-CM

## 2025-08-02 DIAGNOSIS — R07.89 CHEST WALL TENDERNESS: ICD-10-CM
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
## 2025-08-02 DIAGNOSIS — R07.81 RIB PAIN ON LEFT SIDE: ICD-10-CM

## 2025-08-02 PROCEDURE — 71101 X-RAY EXAM UNILAT RIBS/CHEST: CPT | Mod: LEFT SIDE | Performed by: STUDENT IN AN ORGANIZED HEALTH CARE EDUCATION/TRAINING PROGRAM

## 2025-08-02 PROCEDURE — 96372 THER/PROPH/DIAG INJ SC/IM: CPT

## 2025-08-02 PROCEDURE — 71101 X-RAY EXAM UNILAT RIBS/CHEST: CPT | Mod: LT

## 2025-08-02 PROCEDURE — 99284 EMERGENCY DEPT VISIT MOD MDM: CPT

## 2025-08-02 PROCEDURE — 2500000001 HC RX 250 WO HCPCS SELF ADMINISTERED DRUGS (ALT 637 FOR MEDICARE OP)

## 2025-08-02 PROCEDURE — 2500000004 HC RX 250 GENERAL PHARMACY W/ HCPCS (ALT 636 FOR OP/ED): Mod: JZ

## 2025-08-02 RX ORDER — ONDANSETRON 4 MG/1
4 TABLET, FILM COATED ORAL EVERY 6 HOURS
Qty: 12 TABLET | Refills: 0 | Status: SHIPPED | OUTPATIENT
Start: 2025-08-02 | End: 2025-08-05

## 2025-08-02 RX ORDER — KETOROLAC TROMETHAMINE 15 MG/ML
15 INJECTION, SOLUTION INTRAMUSCULAR; INTRAVENOUS ONCE
Status: COMPLETED | OUTPATIENT
Start: 2025-08-02 | End: 2025-08-02

## 2025-08-02 RX ORDER — ACETAMINOPHEN 325 MG/1
650 TABLET ORAL EVERY 6 HOURS PRN
Qty: 30 TABLET | Refills: 0 | Status: SHIPPED | OUTPATIENT
Start: 2025-08-02 | End: 2025-08-12

## 2025-08-02 RX ORDER — OXYCODONE HYDROCHLORIDE 5 MG/1
10 TABLET ORAL ONCE
Refills: 0 | Status: COMPLETED | OUTPATIENT
Start: 2025-08-02 | End: 2025-08-02

## 2025-08-02 RX ORDER — OXYCODONE HYDROCHLORIDE 5 MG/1
5 TABLET ORAL EVERY 6 HOURS PRN
Qty: 12 TABLET | Refills: 0 | Status: SHIPPED | OUTPATIENT
Start: 2025-08-02 | End: 2025-08-05

## 2025-08-02 RX ORDER — IBUPROFEN 800 MG/1
800 TABLET, FILM COATED ORAL 3 TIMES DAILY
Qty: 21 TABLET | Refills: 0 | Status: SHIPPED | OUTPATIENT
Start: 2025-08-02 | End: 2025-08-09

## 2025-08-02 RX ORDER — DOCUSATE SODIUM 100 MG/1
100 CAPSULE, LIQUID FILLED ORAL EVERY 12 HOURS
Qty: 60 CAPSULE | Refills: 0 | Status: SHIPPED | OUTPATIENT
Start: 2025-08-02 | End: 2025-09-01

## 2025-08-02 RX ADMIN — KETOROLAC TROMETHAMINE 15 MG: 15 INJECTION, SOLUTION INTRAMUSCULAR; INTRAVENOUS at 14:01

## 2025-08-02 RX ADMIN — OXYCODONE HYDROCHLORIDE 10 MG: 5 TABLET ORAL at 14:01

## 2025-08-02 ASSESSMENT — PAIN - FUNCTIONAL ASSESSMENT
PAIN_FUNCTIONAL_ASSESSMENT: 0-10
PAIN_FUNCTIONAL_ASSESSMENT: 0-10

## 2025-08-02 ASSESSMENT — ENCOUNTER SYMPTOMS
VOMITING: 0
NUMBNESS: 0
FACIAL ASYMMETRY: 0
SHORTNESS OF BREATH: 0
SINUS PAIN: 0
FEVER: 0
PHOTOPHOBIA: 0
APPETITE CHANGE: 0
SORE THROAT: 0
DIZZINESS: 1
DIARRHEA: 0
ABDOMINAL PAIN: 0
RHINORRHEA: 0
HEADACHES: 0
SINUS PRESSURE: 0
MYALGIAS: 1
LIGHT-HEADEDNESS: 0
CHILLS: 0
SPEECH DIFFICULTY: 0
ARTHRALGIAS: 1
NAUSEA: 0
COUGH: 0
FATIGUE: 0

## 2025-08-02 ASSESSMENT — PAIN SCALES - GENERAL
PAINLEVEL_OUTOF10: 3
PAINLEVEL_OUTOF10: 3

## 2025-08-02 NOTE — PROGRESS NOTES
Subjective   Patient ID: Cady Haynes is a 70 y.o. female. They present today with a chief complaint of side injury (Pt injured lt side of ribs going into back, fell off horse yesterday).    History of Present Illness  This is a 70-year-old female who presents to the clinic today for evaluation after being thrown off her horse yesterday.  Patient states she landed directly on her left side.  She is having rib pain and chest discomfort.  She was wearing a protective vest that inflated with air before she landed.  Patient has history of lumbar fracture, rib fracture, hip fracture all from falling off a horse previously.  She is currently not on blood thinners.  Pain with inhalation if she does not take shallow breaths.  Denies shortness of breath, dyspnea on exertion, orthopnea.  Reports dizziness, no more than baseline due to her vestibular issues.  Thinks she also may have hit her head.  Denies one-sided weakness, slurred speech, facial droop.           Past Medical History  Allergies as of 08/02/2025    (No Known Allergies)       Prescriptions Prior to Admission[1]     Medical History[2]    Surgical History[3]     reports that she has never smoked. She has never used smokeless tobacco. She reports current alcohol use. She reports that she does not use drugs.    Review of Systems  Review of Systems   Constitutional:  Negative for appetite change, chills, fatigue and fever.   HENT:  Negative for congestion, ear pain, rhinorrhea, sinus pressure, sinus pain and sore throat.    Eyes:  Negative for photophobia and visual disturbance.   Respiratory:  Negative for cough and shortness of breath.    Cardiovascular:  Negative for chest pain and leg swelling.   Gastrointestinal:  Negative for abdominal pain, diarrhea, nausea and vomiting.   Musculoskeletal:  Positive for arthralgias and myalgias.   Skin:  Negative for rash.   Neurological:  Positive for dizziness. Negative for facial asymmetry, speech difficulty,  light-headedness, numbness and headaches.   All other systems reviewed and are negative.                                 Objective    Vitals:    08/02/25 1235   BP: 148/76   BP Location: Left arm   Patient Position: Sitting   BP Cuff Size: Large adult   Pulse: 77   Resp: 19   Temp: 36.8 °C (98.2 °F)   TempSrc: Oral   SpO2: 97%   Weight: 69.4 kg (153 lb)     No LMP recorded. Patient is postmenopausal.    Physical Exam  Vitals reviewed.   Constitutional:       Appearance: Normal appearance.   HENT:      Head: Normocephalic and atraumatic.      Right Ear: Tympanic membrane, ear canal and external ear normal.      Left Ear: Tympanic membrane, ear canal and external ear normal.      Nose: Nose normal.      Mouth/Throat:      Mouth: Mucous membranes are moist.      Pharynx: Oropharynx is clear.     Eyes:      Extraocular Movements: Extraocular movements intact.      Conjunctiva/sclera: Conjunctivae normal.      Pupils: Pupils are equal, round, and reactive to light.       Cardiovascular:      Rate and Rhythm: Normal rate and regular rhythm.      Pulses: Normal pulses.      Heart sounds: Normal heart sounds.   Pulmonary:      Effort: Pulmonary effort is normal.      Breath sounds: Normal breath sounds.   Chest:      Chest wall: Tenderness present.     Abdominal:      General: Abdomen is flat. Bowel sounds are normal.      Palpations: Abdomen is soft.     Musculoskeletal:         General: Normal range of motion.      Cervical back: Normal range of motion and neck supple.     Skin:     General: Skin is warm and dry.      Capillary Refill: Capillary refill takes less than 2 seconds.     Neurological:      General: No focal deficit present.      Mental Status: She is alert and oriented to person, place, and time.         Procedures    Point of Care Test & Imaging Results from this visit  No results found for this visit on 08/02/25.   Imaging  No results found.    Cardiology, Vascular, and Other Imaging  No other imaging  results found for the past 2 days      Diagnostic study results (if any) were reviewed by Kristin L Schoenlein, APRN-CNP.    Assessment/Plan   Allergies, medications, history, and pertinent labs/EKGs/Imaging reviewed by Kristin L Schoenlein, APRN-CNP.     Medical Decision Making  Vital signs within normal limits, patient no acute distress and nontoxic-appearing.  Patient has focal left-sided rib pain along with left-sided tenderness to palpation is seen on image above.  Neuroexam unremarkable.  Due to nature of injury cannot rule out acute pathology in urgent care setting as I do believe patient needs CT head, chest, possible spine.    Differential diagnosis include, however not limited to: Fracture, pneumothorax, concussion, subarachnoid hemorrhage, subdural hematoma, hemorrhage    Discussed with patient need to be seen in the emergency room for further evaluation/work-up including, however not limited to, advanced imaging and lab work.  Patient is in agreement with plan and verbalizes understanding.  Patient left the clinic in stable condition via private vehicle    Orders and Diagnoses  Diagnoses and all orders for this visit:  Animal-rider injured by fall from or being thrown from horse in noncollision accident, initial encounter  Rib pain on left side  Dizziness  Chest wall tenderness      Medical Admin Record      Patient disposition: ED via private vehicle.     Electronically signed by Kristin L Schoenlein, APRN-CNP  1:26 PM           [1] (Not in a hospital admission)   [2]   Past Medical History:  Diagnosis Date    Acetabular fracture (Multi) 09/13/2023    Acute upper respiratory infection, unspecified 12/10/2014    Acute URI    Candidiasis of skin and nail 06/10/2021    Yeast infection of the skin    Cardiomyopathy 09/13/2023    Cardiomyopathy, unspecified     Cardiomyopathy    Cellulitis of unspecified part of limb 06/16/2014    Cellulitis of leg    Cellulitis, unspecified     Cellulitis    Contusion  09/13/2023    Contusion of unspecified front wall of thorax, initial encounter 06/16/2014    Rib contusion    Contusion of unspecified lower leg, initial encounter 10/13/2021    Superficial bruising of lower leg    Effusion, unspecified ankle 10/15/2019    Ankle edema    Encounter for general adult medical examination without abnormal findings 12/09/2015    Annual physical exam    Encounter for immunization 12/09/2015    Encounter for immunization    Encounter for other preprocedural examination 12/07/2020    Preoperative clearance    Encounter for other specified surgical aftercare 12/06/2017    Postoperative visit    Encounter for preprocedural cardiovascular examination 11/30/2020    Preop cardiovascular exam    Encounter for preprocedural laboratory examination 11/22/2021    Blood tests prior to treatment or procedure    Enlarged lymph nodes, unspecified 04/02/2019    Swollen lymph nodes    Fall from horse 09/13/2023    Fracture of thoracic spine (Multi) 09/13/2023    Hamstring injury 09/13/2023    Hypercalcemia 09/13/2023    Hyperlipidemia     Hypertension     Impaired fasting glucose 02/08/2018    Impaired fasting glucose    Infection and inflammatory reaction due to other internal prosthetic devices, implants and grafts, initial encounter 06/17/2018    Infection of BAHA bone implant    Injury of left knee 09/13/2023    Insect bite (nonvenomous), left ankle, initial encounter (CODE) 08/31/2020    Infected insect bite of left ankle, initial encounter    Left acoustic neuroma (Multi) 09/13/2023    Menopausal symptom 09/13/2023    Metabolic syndrome 02/25/2015    Metabolic syndrome    NICM (nonischemic cardiomyopathy) (Multi) 09/13/2023    Non-Hodgkin lymphoma, unspecified, unspecified site     Non-Hodgkin's lymphoma    Non-Hodgkin's lymphoma 09/13/2023    Osteopenia 09/13/2023    Other conditions influencing health status     Basal Cell Adenocarcinoma Of The Nasal Cavity    Other conditions influencing health  status 11/06/2012    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    Other reduced mobility 03/20/2020    Impaired mobility and ADLs    Pain in left hip 12/15/2020    Left hip pain    Pain in left knee 12/23/2019    Left knee pain    Paroxysmal supraventricular tachycardia 09/13/2023    Personal history of diseases of the skin and subcutaneous tissue     History of rosacea    Personal history of other diseases of the circulatory system     History of paroxysmal supraventricular tachycardia    Personal history of other diseases of the musculoskeletal system and connective tissue 10/27/2017    History of neck pain    Personal history of other diseases of the respiratory system     History of pleural effusion    Personal history of other drug therapy 11/06/2012    History of influenza vaccination    Personal history of other endocrine, nutritional and metabolic disease 09/12/2018    History of hypokalemia    Personal history of other mental and behavioral disorders 12/04/2013    History of depression    Personal history of other specified conditions 09/26/2014    History of abdominal pain    Personal history of other specified conditions 08/27/2013    History of edema    Personal history of other specified conditions 10/27/2017    History of dizziness    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    Stiffness of left knee, not elsewhere classified 03/20/2020    Decreased range of motion of left knee    Unspecified acute conjunctivitis, left eye 12/14/2020    Acute bacterial conjunctivitis of left eye    Unspecified fracture of left acetabulum, initial encounter for closed fracture (Multi) 06/12/2020    Left acetabular fracture    Weakness of left hip 09/13/2023   [3]   Past Surgical History:  Procedure Laterality Date    BASAL CELL CARCINOMA EXCISION  2001    CARDIAC CATHETERIZATION N/A 09/06/2024    Procedure: Left Heart Cath;  Surgeon: Desmond Saeed MD;  Location: [facility];   Service: Cardiovascular;  Laterality: N/A;    HIP FRACTURE SURGERY Left 09/2019    LYMPH NODE BIOPSY  1998    X3    OTHER SURGICAL HISTORY  11/06/2012    Autologous Bone Marrow Transplant    OTHER SURGICAL HISTORY  1999    Stem cell transplant    REVISION TOTAL HIP ARTHROPLASTY Left 12/2020    TONSILLECTOMY  11/06/2012    Tonsillectomy

## 2025-08-02 NOTE — PATIENT INSTRUCTIONS
Thank you for letting me care for you today. As discussed , an emergency room evaluation has been recommended. You  have agreed to be seen in the emergency room following today's visit.  Failure to be seen and delaying care can lead to complications including, however not limited to, suffering, long-term disability.

## 2025-08-03 ENCOUNTER — TELEPHONE (OUTPATIENT)
Dept: URGENT CARE | Age: 70
End: 2025-08-03

## 2025-08-07 NOTE — ED PROVIDER NOTES
HPI   Chief Complaint   Patient presents with    Rib Injury     Pt states she fell off her horse, states her ribs hurt and she doesn't believe she hit her head but her neck is sore.       HPI  Patient is a 70-year-old female who presents ED for left-sided rib injury.  Patient fell off her horse today.  She denies any head strike or loss of consciousness.  She complains primarily of left-sided rib pain more posterior.  She endorses some difficulty taking deep breaths.  Denies any other acute complaints or injuries.      Patient History   Medical History[1]  Surgical History[2]  Family History[3]  Social History[4]    Physical Exam   ED Triage Vitals [08/02/25 1333]   Temperature Heart Rate Respirations BP   36.9 °C (98.4 °F) 75 18 152/76      Pulse Ox Temp src Heart Rate Source Patient Position   98 % -- -- Sitting      BP Location FiO2 (%)     Right arm --       Physical Exam  Vitals reviewed.   HENT:      Head: Atraumatic.     Eyes:      Extraocular Movements: Extraocular movements intact.       Cardiovascular:      Rate and Rhythm: Normal rate.   Pulmonary:      Effort: Pulmonary effort is normal.   Chest:      Chest wall: Tenderness (Bony tenderness over left posterior ribs) present.     Musculoskeletal:         General: Normal range of motion.      Cervical back: Normal range of motion.     Skin:     General: Skin is dry.     Neurological:      Mental Status: She is alert and oriented to person, place, and time.     Psychiatric:         Mood and Affect: Mood normal.         Behavior: Behavior normal.           ED Course & MDM   Diagnoses as of 08/07/25 1017   Closed fracture of multiple ribs of left side, initial encounter                 No data recorded     Chris Coma Scale Score: 15 (08/02/25 1344 : Ira Franco RN)                           Medical Decision Making  Parts of this chart have been completed using voice recognition software. Please excuse any errors of transcription.  My thought process and  reason for plan has been formulated from the time that I saw the patient until the time of disposition and is not specific to one specific moment during their visit and furthermore my MDM encompasses this entire chart and not only this text box.    HPI:   A medically appropriate HPI was obtained, outlined above.    Cady Haynes is a  70 y.o. female    Chief Complaint   Patient presents with    Rib Injury     Pt states she fell off her horse, states her ribs hurt and she doesn't believe she hit her head but her neck is sore.       Medical History[5]    Surgical History[6]    Social History[7]    Family History[8]    Allergies[9]    Current Outpatient Medications   Medication Instructions    acetaminophen (TYLENOL) 650 mg, oral, Every 6 hours PRN    alendronate (FOSAMAX) 70 mg, oral, Every 7 days, Take in the morning with a full glass of water, on an empty stomach, and do not take anything else by mouth or lie down for the next 30 min.    alpha tocopherol (Vitamin E) 670 mg (1,000 unit) capsule 1 capsule, Daily    atorvastatin (LIPITOR) 20 mg, oral, Every 24 hours    CALCIUM CITRATE-VITAMIN D2 ORAL 1 tablet, Daily    docusate sodium (COLACE) 100 mg, oral, Every 12 hours    ibuprofen 800 mg, oral, 3 times daily    levothyroxine (SYNTHROID, LEVOXYL) 137 mcg, oral, Daily before breakfast    losartan-hydrochlorothiazide (Hyzaar) 100-25 mg tablet 1 tablet, oral, Daily    metoprolol succinate XL (TOPROL-XL) 100 mg, oral, Daily, Do not crush or chew.    multivitamin tablet 1 tablet, Daily    spironolactone (ALDACTONE) 25 mg, oral, Daily   for details    Exam:   No data found.    A medically appropriate exam performed, outlined above, given the known history and presentation.    EKG/Cardiac monitor:   If EKG was done and, it was interpreted by attending physician, see their note for ED course for more detail.    Medications given during visit:  Medications   oxyCODONE (Roxicodone) immediate release tablet 10 mg (10 mg oral  Given 25 140)   ketorolac (Toradol) injection 15 mg (15 mg intramuscular Given 25 140)        Diagnostic/tests:  Labs Reviewed - No data to display     XR ribs 2 views left w chest pa or ap   Final Result        Acute mildly displaced left 5th and 6th posterior rib fractures. No   pneumothorax.        MACRO        None        Signed by: Bebo Engel 2025 2:51 PM   Dictation workstation:   [id number]             [facility] Summary:  Mildly displaced left fifth and 6 posterior rib fractures.  No evidence of pneumothorax.  Patient is hemodynamically stable.  Aftercare instructions were discussed.  Prescriptions for pain provided.    We have discussed the diagnosis and risks, and we agree with discharging home to follow-up with appropriate physician as directed. We also discussed returning to the Emergency Department immediately if new or worsening symptoms occur. We have discussed the symptoms which are most concerning that necessitate immediate return. Pt symptoms have been well controlled here and the patient is safe for discharge with appropriate outpatient follow up. The patient has verbalized understanding to return to ER without delay for new or worsening pains or for any other symptoms or concerns. I utilized the discharge clinical management tool provided [employer] to help estimate risk of negative outcome for this patient.        Disposition:  ED Prescriptions       Medication Sig Dispense Start Date End Date Auth. Provider    oxyCODONE (Roxicodone) 5 mg immediate release tablet () Take 1 tablet (5 mg) by mouth every 6 hours if needed for severe pain (7 - 10) for up to 3 days. 12 tablet 2025 Deshaun Cason PA-C    acetaminophen (Tylenol) 325 mg tablet Take 2 tablets (650 mg) by mouth every 6 hours if needed for mild pain (1 - 3) for up to 10 days. 30 tablet 2025 Deshaun Cason PA-C    ibuprofen 800 mg tablet Take 1 tablet (800 mg) by mouth 3 times a  day for 7 days. 21 tablet 2025 Deshaun Cason PA-C    ondansetron (Zofran) 4 mg tablet () Take 1 tablet (4 mg) by mouth every 6 hours for 3 days. 12 tablet 2025 Deshaun Cason PA-C    docusate sodium (Colace) 100 mg capsule Take 1 capsule (100 mg) by mouth every 12 hours. 60 capsule 2025 Deshaun Cason PA-C            All of the patient's questions were answered to the best of my ability. Patient states understanding that they have been screened for an emergency today and we have not found any etiology of symptoms that requires emergent treatment or admission to the hospital at this point. They understand that they may have not had definitive care today and require follow-up for treatment of their condition. They also state understanding that they may have an emergent condition that may potentially have not of detected at this visit and they must return to the emergency department if they develop any worsening of symptoms or new complaints.       Procedure  Procedures       [1]   Past Medical History:  Diagnosis Date    Acetabular fracture (Multi) 2023    Acute upper respiratory infection, unspecified 12/10/2014    Acute URI    Candidiasis of skin and nail 06/10/2021    Yeast infection of the skin    Cardiomyopathy 2023    Cardiomyopathy, unspecified     Cardiomyopathy    Cellulitis of unspecified part of limb 2014    Cellulitis of leg    Cellulitis, unspecified     Cellulitis    Contusion 2023    Contusion of unspecified front wall of thorax, initial encounter 2014    Rib contusion    Contusion of unspecified lower leg, initial encounter 10/13/2021    Superficial bruising of lower leg    Effusion, unspecified ankle 10/15/2019    Ankle edema    Encounter for general adult medical examination without abnormal findings 2015    Annual physical exam    Encounter for immunization 2015    Encounter for immunization    Encounter  for other preprocedural examination 12/07/2020    Preoperative clearance    Encounter for other specified surgical aftercare 12/06/2017    Postoperative visit    Encounter for preprocedural cardiovascular examination 11/30/2020    Preop cardiovascular exam    Encounter for preprocedural laboratory examination 11/22/2021    Blood tests prior to treatment or procedure    Enlarged lymph nodes, unspecified 04/02/2019    Swollen lymph nodes    Fall from horse 09/13/2023    Fracture of thoracic spine (Multi) 09/13/2023    Hamstring injury 09/13/2023    Hypercalcemia 09/13/2023    Hyperlipidemia     Hypertension     Impaired fasting glucose 02/08/2018    Impaired fasting glucose    Infection and inflammatory reaction due to other internal prosthetic devices, implants and grafts, initial encounter 06/17/2018    Infection of BAHA bone implant    Injury of left knee 09/13/2023    Insect bite (nonvenomous), left ankle, initial encounter (CODE) 08/31/2020    Infected insect bite of left ankle, initial encounter    Left acoustic neuroma (Multi) 09/13/2023    Menopausal symptom 09/13/2023    Metabolic syndrome 02/25/2015    Metabolic syndrome    NICM (nonischemic cardiomyopathy) (Multi) 09/13/2023    Non-Hodgkin lymphoma, unspecified, unspecified site     Non-Hodgkin's lymphoma    Non-Hodgkin's lymphoma 09/13/2023    Osteopenia 09/13/2023    Other conditions influencing health status     Basal Cell Adenocarcinoma Of The Nasal Cavity    Other conditions influencing health status 11/06/2012    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    Other reduced mobility 03/20/2020    Impaired mobility and ADLs    Pain in left hip 12/15/2020    Left hip pain    Pain in left knee 12/23/2019    Left knee pain    Paroxysmal supraventricular tachycardia 09/13/2023    Personal history of diseases of the skin and subcutaneous tissue     History of rosacea    Personal history of other diseases of the circulatory system     History  of paroxysmal supraventricular tachycardia    Personal history of other diseases of the musculoskeletal system and connective tissue 10/27/2017    History of neck pain    Personal history of other diseases of the respiratory system     History of pleural effusion    Personal history of other drug therapy 11/06/2012    History of influenza vaccination    Personal history of other endocrine, nutritional and metabolic disease 09/12/2018    History of hypokalemia    Personal history of other mental and behavioral disorders 12/04/2013    History of depression    Personal history of other specified conditions 09/26/2014    History of abdominal pain    Personal history of other specified conditions 08/27/2013    History of edema    Personal history of other specified conditions 10/27/2017    History of dizziness    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    Stiffness of left knee, not elsewhere classified 03/20/2020    Decreased range of motion of left knee    Unspecified acute conjunctivitis, left eye 12/14/2020    Acute bacterial conjunctivitis of left eye    Unspecified fracture of left acetabulum, initial encounter for closed fracture (Multi) 06/12/2020    Left acetabular fracture    Weakness of left hip 09/13/2023   [2]   Past Surgical History:  Procedure Laterality Date    BASAL CELL CARCINOMA EXCISION  2001    CARDIAC CATHETERIZATION N/A 09/06/2024    Procedure: Left Heart Cath;  Surgeon: Desmond Saeed MD;  Location: [facility] Cardiac Cath Lab;  Service: Cardiovascular;  Laterality: N/A;    HIP FRACTURE SURGERY Left 09/2019    LYMPH NODE BIOPSY  1998    X3    OTHER SURGICAL HISTORY  11/06/2012    Autologous Bone Marrow Transplant    OTHER SURGICAL HISTORY  1999    Stem cell transplant    REVISION TOTAL HIP ARTHROPLASTY Left 12/2020    TONSILLECTOMY  11/06/2012    Tonsillectomy   [3]   Family History  Problem Relation Name Age of Onset    Kidney failure Mother      COPD Mother      Arthritis Mother       Breast cancer Mother      Heart disease Mother      COPD Father      Diabetes Father      Heart failure Sister     [4]   Social History  Tobacco Use    Smoking status: Never    Smokeless tobacco: Never   Vaping Use    Vaping status: Never Used   Substance Use Topics    Alcohol use: Yes     Comment: occassional    Drug use: Never   [5]   Past Medical History:  Diagnosis Date    Acetabular fracture (Multi) 09/13/2023    Acute upper respiratory infection, unspecified 12/10/2014    Acute URI    Candidiasis of skin and nail 06/10/2021    Yeast infection of the skin    Cardiomyopathy 09/13/2023    Cardiomyopathy, unspecified     Cardiomyopathy    Cellulitis of unspecified part of limb 06/16/2014    Cellulitis of leg    Cellulitis, unspecified     Cellulitis    Contusion 09/13/2023    Contusion of unspecified front wall of thorax, initial encounter 06/16/2014    Rib contusion    Contusion of unspecified lower leg, initial encounter 10/13/2021    Superficial bruising of lower leg    Effusion, unspecified ankle 10/15/2019    Ankle edema    Encounter for general adult medical examination without abnormal findings 12/09/2015    Annual physical exam    Encounter for immunization 12/09/2015    Encounter for immunization    Encounter for other preprocedural examination 12/07/2020    Preoperative clearance    Encounter for other specified surgical aftercare 12/06/2017    Postoperative visit    Encounter for preprocedural cardiovascular examination 11/30/2020    Preop cardiovascular exam    Encounter for preprocedural laboratory examination 11/22/2021    Blood tests prior to treatment or procedure    Enlarged lymph nodes, unspecified 04/02/2019    Swollen lymph nodes    Fall from horse 09/13/2023    Fracture of thoracic spine (Multi) 09/13/2023    Hamstring injury 09/13/2023    Hypercalcemia 09/13/2023    Hyperlipidemia     Hypertension     Impaired fasting glucose 02/08/2018    Impaired fasting glucose    Infection and  inflammatory reaction due to other internal prosthetic devices, implants and grafts, initial encounter 06/17/2018    Infection of BAHA bone implant    Injury of left knee 09/13/2023    Insect bite (nonvenomous), left ankle, initial encounter (CODE) 08/31/2020    Infected insect bite of left ankle, initial encounter    Left acoustic neuroma (Multi) 09/13/2023    Menopausal symptom 09/13/2023    Metabolic syndrome 02/25/2015    Metabolic syndrome    NICM (nonischemic cardiomyopathy) (Multi) 09/13/2023    Non-Hodgkin lymphoma, unspecified, unspecified site     Non-Hodgkin's lymphoma    Non-Hodgkin's lymphoma 09/13/2023    Osteopenia 09/13/2023    Other conditions influencing health status     Basal Cell Adenocarcinoma Of The Nasal Cavity    Other conditions influencing health status 11/06/2012    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    Other reduced mobility 03/20/2020    Impaired mobility and ADLs    Pain in left hip 12/15/2020    Left hip pain    Pain in left knee 12/23/2019    Left knee pain    Paroxysmal supraventricular tachycardia 09/13/2023    Personal history of diseases of the skin and subcutaneous tissue     History of rosacea    Personal history of other diseases of the circulatory system     History of paroxysmal supraventricular tachycardia    Personal history of other diseases of the musculoskeletal system and connective tissue 10/27/2017    History of neck pain    Personal history of other diseases of the respiratory system     History of pleural effusion    Personal history of other drug therapy 11/06/2012    History of influenza vaccination    Personal history of other endocrine, nutritional and metabolic disease 09/12/2018    History of hypokalemia    Personal history of other mental and behavioral disorders 12/04/2013    History of depression    Personal history of other specified conditions 09/26/2014    History of abdominal pain    Personal history of other specified  conditions 08/27/2013    History of edema    Personal history of other specified conditions 10/27/2017    History of dizziness    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    Stiffness of left knee, not elsewhere classified 03/20/2020    Decreased range of motion of left knee    Unspecified acute conjunctivitis, left eye 12/14/2020    Acute bacterial conjunctivitis of left eye    Unspecified fracture of left acetabulum, initial encounter for closed fracture (Multi) 06/12/2020    Left acetabular fracture    Weakness of left hip 09/13/2023   [6]   Past Surgical History:  Procedure Laterality Date    BASAL CELL CARCINOMA EXCISION  2001    CARDIAC CATHETERIZATION N/A 09/06/2024    Procedure: Left Heart Cath;  Surgeon: Desmond Saeed MD;  Location: [facility] Cardiac Cath Lab;  Service: Cardiovascular;  Laterality: N/A;    HIP FRACTURE SURGERY Left 09/2019    LYMPH NODE BIOPSY  1998    X3    OTHER SURGICAL HISTORY  11/06/2012    Autologous Bone Marrow Transplant    OTHER SURGICAL HISTORY  1999    Stem cell transplant    REVISION TOTAL HIP ARTHROPLASTY Left 12/2020    TONSILLECTOMY  11/06/2012    Tonsillectomy   [7]   Social History  Tobacco Use    Smoking status: Never    Smokeless tobacco: Never   Vaping Use    Vaping status: Never Used   Substance Use Topics    Alcohol use: Yes     Comment: occassional    Drug use: Never   [8]   Family History  Problem Relation Name Age of Onset    Kidney failure Mother      COPD Mother      Arthritis Mother      Breast cancer Mother      Heart disease Mother      COPD Father      Diabetes Father      Heart failure Sister     [9] No Known Allergies       Deshaun Cason PA-C  08/07/25 1019

## 2025-08-12 ENCOUNTER — OFFICE VISIT (OUTPATIENT)
Dept: PRIMARY CARE | Facility: CLINIC | Age: 70
End: 2025-08-12
Payer: MEDICARE

## 2025-08-12 VITALS
DIASTOLIC BLOOD PRESSURE: 76 MMHG | SYSTOLIC BLOOD PRESSURE: 159 MMHG | HEIGHT: 64 IN | OXYGEN SATURATION: 96 % | WEIGHT: 158 LBS | BODY MASS INDEX: 26.98 KG/M2 | HEART RATE: 63 BPM

## 2025-08-12 DIAGNOSIS — S22.42XD CLOSED FRACTURE OF MULTIPLE RIBS OF LEFT SIDE WITH ROUTINE HEALING, SUBSEQUENT ENCOUNTER: Primary | ICD-10-CM

## 2025-08-12 PROCEDURE — 1159F MED LIST DOCD IN RCRD: CPT

## 2025-08-12 PROCEDURE — 3078F DIAST BP <80 MM HG: CPT

## 2025-08-12 PROCEDURE — 99213 OFFICE O/P EST LOW 20 MIN: CPT

## 2025-08-12 PROCEDURE — 1036F TOBACCO NON-USER: CPT

## 2025-08-12 PROCEDURE — 3008F BODY MASS INDEX DOCD: CPT

## 2025-08-12 PROCEDURE — 3077F SYST BP >= 140 MM HG: CPT

## 2025-08-12 RX ORDER — CELECOXIB 200 MG/1
200 CAPSULE ORAL 2 TIMES DAILY
Qty: 60 CAPSULE | Refills: 0 | Status: SHIPPED | OUTPATIENT
Start: 2025-08-12 | End: 2026-02-08

## 2025-08-12 RX ORDER — METHYLPREDNISOLONE 4 MG/1
TABLET ORAL
Qty: 21 TABLET | Refills: 0 | Status: SHIPPED | OUTPATIENT
Start: 2025-08-12 | End: 2025-08-18

## 2025-08-12 ASSESSMENT — ENCOUNTER SYMPTOMS
UNEXPECTED WEIGHT CHANGE: 0
NECK STIFFNESS: 0
FEVER: 0
JOINT SWELLING: 0
NECK PAIN: 0
FATIGUE: 0
ACTIVITY CHANGE: 1
ARTHRALGIAS: 1
MYALGIAS: 1

## 2025-08-30 ENCOUNTER — OFFICE VISIT (OUTPATIENT)
Dept: URGENT CARE | Age: 70
End: 2025-08-30
Payer: MEDICARE

## 2025-08-30 ENCOUNTER — HOSPITAL ENCOUNTER (EMERGENCY)
Facility: HOSPITAL | Age: 70
Discharge: HOME | End: 2025-08-30
Payer: MEDICARE

## 2025-08-30 ENCOUNTER — APPOINTMENT (OUTPATIENT)
Dept: RADIOLOGY | Facility: HOSPITAL | Age: 70
End: 2025-08-30
Payer: MEDICARE

## 2025-08-30 VITALS
OXYGEN SATURATION: 97 % | BODY MASS INDEX: 27.12 KG/M2 | HEART RATE: 90 BPM | DIASTOLIC BLOOD PRESSURE: 69 MMHG | SYSTOLIC BLOOD PRESSURE: 150 MMHG | RESPIRATION RATE: 16 BRPM | TEMPERATURE: 99.6 F | WEIGHT: 158 LBS

## 2025-08-30 DIAGNOSIS — M79.662 PAIN OF LEFT CALF: Primary | ICD-10-CM

## 2025-08-30 ASSESSMENT — PAIN - FUNCTIONAL ASSESSMENT: PAIN_FUNCTIONAL_ASSESSMENT: 0-10

## 2025-08-30 ASSESSMENT — ENCOUNTER SYMPTOMS
ARTHRALGIAS: 1
COLOR CHANGE: 1

## 2025-08-30 ASSESSMENT — PAIN SCALES - GENERAL: PAINLEVEL_OUTOF10: 6

## 2025-12-15 ENCOUNTER — APPOINTMENT (OUTPATIENT)
Dept: PRIMARY CARE | Facility: CLINIC | Age: 70
End: 2025-12-15
Payer: MEDICARE

## 2025-12-18 ENCOUNTER — APPOINTMENT (OUTPATIENT)
Dept: PRIMARY CARE | Facility: CLINIC | Age: 70
End: 2025-12-18
Payer: MEDICARE

## (undated) DEVICE — MANIFOLD KIT, CUSTOM, GEAUGA

## (undated) DEVICE — ANGIO KIT, LEFT HEART, LF, CUSTOM

## (undated) DEVICE — TR BAND, RADIAL COMPRESSION, STANDARD, 24CM

## (undated) DEVICE — CATHETER, OPTITORQUE, 5FR, TIG1, 1H/100CM

## (undated) DEVICE — INTRODUCER SHEATH, GLIDESHEATH, 6FR 10CM